# Patient Record
Sex: MALE | Race: BLACK OR AFRICAN AMERICAN | Employment: FULL TIME | URBAN - METROPOLITAN AREA
[De-identification: names, ages, dates, MRNs, and addresses within clinical notes are randomized per-mention and may not be internally consistent; named-entity substitution may affect disease eponyms.]

---

## 2017-01-13 ENCOUNTER — OFFICE VISIT (OUTPATIENT)
Dept: ENDOCRINOLOGY | Age: 62
End: 2017-01-13

## 2017-01-13 VITALS
OXYGEN SATURATION: 99 % | HEART RATE: 77 BPM | RESPIRATION RATE: 18 BRPM | BODY MASS INDEX: 33.18 KG/M2 | HEIGHT: 72 IN | TEMPERATURE: 97.2 F | SYSTOLIC BLOOD PRESSURE: 132 MMHG | WEIGHT: 245 LBS | DIASTOLIC BLOOD PRESSURE: 80 MMHG

## 2017-01-13 DIAGNOSIS — I10 ESSENTIAL HYPERTENSION: ICD-10-CM

## 2017-01-13 DIAGNOSIS — Z79.4 ENCOUNTER FOR LONG-TERM (CURRENT) USE OF INSULIN (HCC): ICD-10-CM

## 2017-01-13 DIAGNOSIS — E10.65 HYPERGLYCEMIA DUE TO TYPE 1 DIABETES MELLITUS (HCC): Primary | ICD-10-CM

## 2017-01-13 DIAGNOSIS — E78.2 MIXED HYPERLIPIDEMIA: ICD-10-CM

## 2017-01-13 LAB — HBA1C MFR BLD HPLC: 7.2 %

## 2017-01-13 RX ORDER — INSULIN LISPRO 100 [IU]/ML
INJECTION, SOLUTION INTRAVENOUS; SUBCUTANEOUS
Qty: 15 ML | Refills: 6 | Status: SHIPPED | OUTPATIENT
Start: 2017-01-13 | End: 2018-01-16 | Stop reason: SDUPTHER

## 2017-01-13 NOTE — MR AVS SNAPSHOT
Visit Information Date & Time Provider Department Dept. Phone Encounter #  
 1/13/2017  9:15 AM Lane Childress MD [facility] End[facility]ology [phone] [id number] Follow-up Instructions Return in about 4 months (around 5/13/2017). Upcoming Health Maintenance Date Due Hepatitis C Screening 1955 FOOT EXAM Q1 10/21/1967 EYE EXAM RETINAL OR DILATED Q1 10/21/1967 Pneumococcal 19-64 Medium Risk (1 of 1 - PPSV23) 10/21/1976 DTaP/Tdap/Td series (1 - Tdap) 10/21/1978 FOBT Q 1 YEAR AGE 50-75 10/21/2007 INFLUENZA AGE 9 TO ADULT 8/1/2016 HEMOGLOBIN A1C Q6M 3/30/2017 MICROALBUMIN Q1 9/30/2017 LIPID PANEL Q1 9/30/2017 Allergies as of 1/13/2017  Review Complete On: 1/13/2017 By: Lane Childress MD  
 Not on File Current Immunizations  Never Reviewed No immunizations on file. Not reviewed this visit You Were Diagnosed With   
  
 Codes Comments Uncontrolled diabetes mellitus type 2 without complications, unspecified long term insulin use status ([facility].)    -  Primary ICD-10-CM: E11.65 ICD-9-CM: 250.02 Vitals BP Pulse Temp Resp Height(growth percentile) Weight(growth percentile) 132/80 77 97.2 °F (36.2 °C) (Oral) 18 6' (1.829 m) 245 lb (111.1 kg) SpO2 BMI Smoking Status 99% 33.23 kg/m2 Never Smoker BMI and BSA Data Body Mass Index Body Surface Area  
 33.23 kg/m 2 2.38 m 2 Preferred Pharmacy Pharmacy Name Phone [address], [facility], [place] [address] [phone] Your Updated Medication List  
  
   
This list is accurate as of: 1/13/17  9:45 AM.  Always use your most recent med list.  
  
  
  
  
 glucagon 1 mg injection Commonly known as:  GLUCAGEN  
1 mL by IntraMUSCular route as needed for Hypoglycemia. glucose blood VI test strips strip Commonly known as:  Myrtie Lindon Test 4 times daily.  Dx code E11.65  
  
 insulin glargine 100 unit/mL (3 mL) pen Commonly known as:  LANTUS SOLOSTAR INJECT 20 UNITS AT BEDTIME  
  
 insulin lispro 100 unit/mL kwikpen Commonly known as:  HUMALOG Inject 1 unit before breakfast, and 5 units before lunch and dinner. Plus sliding scale. Insulin Needles (Disposable) 32 gauge x 5/32\" Ndle Commonly known as:  Emelina Pen Needle Use 4 times daily. Dx code E11.65 Lancets Misc Commonly known as: One Touch Reggy Chew Test 4 times daily. Dx code E11.65  
  
 lisinopril 5 mg tablet Commonly known as:  PRINIVIL, ZESTRIL  
TAKE ONE TABLET BY MOUTH DAILY - STOP 10 MG DOSE  
  
 metFORMIN 1,000 mg tablet Commonly known as:  GLUCOPHAGE  
TAKE ONE TABLET BY MOUTH 2 TIMES A DAY WITH MEALS  
  
 multivitamin tablet Commonly known as:  ONE A DAY Take 1 tablet by mouth daily. pravastatin 40 mg tablet Commonly known as:  PRAVACHOL  
TAKE ONE TABLET BY MOUTH NIGHTLY We Performed the Following AMB POC HEMOGLOBIN A1C [32849 CPT(R)] Follow-up Instructions Return in about 4 months (around 5/13/2017). Patient Instructions Check blood sugars immediately before each meal and at bedtime 
 
 
continue   Lantus insulin  20  units  at bed time Take  humalog 1 unit with b-fast  , 5 units with lunch  And 5 units with dinner OR Carb to insulin ratio 8 gm : 1 unit Take additional humalog  as folllows with meals: 
 
150-200 mg 1 units 201-250 mg 2 units 251-300 mg 3 units 301-350 mg 4 units 351-400 mg 5 units 401-450 mg 6 units 451-500 mg 7 units 
 
 
 
lisinopril to 5 mg a day Do not skip meals Do not eat in between meals Reduce carbs- pasta, rice, potatoes, bread Do not drink juices or sodas Donot eat peanut butter Do not eat sugar free cookies and cakes Do not eat peaches, grapes , pine apples and oranges Introducing \[facility] [place] [facility]\"" & HEALTH SERVICES! Angie Mclean introduces [employer] patient portal. Now you can access parts of your medical record, email your doctor's office, and request medication refills online. 1. In your internet browser, go to [email or web address] 2. Click on the First Time User? Click Here link in the Sign In box. You will see the New Member Sign Up page. 3. Enter your [employer] Access Code exactly as it appears below. You will not need to use this code after youve completed the sign-up process. If you do not sign up before the expiration date, you must request a new code. · [employer] Access Code: [email or web address] Expires: 4/13/2017  9:45 AM 
 
4. Enter the last four digits of your Social Security Number (xxxx) and Date of Birth (mm/dd/yyyy) as indicated and click Submit. You will be taken to the next sign-up page. 5. Create a [employer] ID. This will be your [employer] login ID and cannot be changed, so think of one that is secure and easy to remember. 6. Create a [employer] password. You can change your password at any time. 7. Enter your Password Reset Question and Answer. This can be used at a later time if you forget your password. 8. Enter your e-mail address. You will receive e-mail notification when new information is available in [address] 9. Click Sign Up. You can now view and download portions of your medical record. 10. Click the Download Summary menu link to download a portable copy of your medical information. If you have questions, please visit the Frequently Asked Questions section of the [employer] website. Remember, [employer] is NOT to be used for urgent needs. For medical emergencies, dial 911. Now available from your iPhone and Android! Please provide this summary of care documentation to your next provider. Your primary care clinician is listed as Edith Aguila. If you have any questions after today's visit, please call [phone].

## 2017-01-13 NOTE — PATIENT INSTRUCTIONS
Check blood sugars immediately before each meal and at bedtime      continue   Lantus insulin  20  units  at bed time    Take  humalog 1 unit with b-fast  , 5 units with lunch  And 5 units with dinner     OR    Carb to insulin ratio 8 gm : 1 unit     Take additional humalog  as folllows with meals:    150-200 mg 1 units    201-250 mg 2 units    251-300 mg 3 units    301-350 mg 4 units    351-400 mg 5 units    401-450 mg 6 units    451-500 mg 7 units        lisinopril to 5 mg a day         Do not skip meals  Do not eat in between meals    Reduce carbs- pasta, rice, potatoes, bread   Do not drink juices or sodas  Donot eat peanut butter     Do not eat sugar free cookies and cakes   Do not eat peaches, grapes , pine apples and oranges

## 2017-01-13 NOTE — PROGRESS NOTES
Wt Readings from Last 3 Encounters:   01/13/17 245 lb (111.1 kg)   10/07/16 241 lb (109.3 kg)   06/10/16 239 lb (108.4 kg)     Temp Readings from Last 3 Encounters:   01/13/17 97.2 °F (36.2 °C) (Oral)   10/07/16 98.5 °F (36.9 °C) (Oral)   06/10/16 97.5 °F (36.4 °C) (Oral)     BP Readings from Last 3 Encounters:   01/13/17 132/80   10/07/16 126/71   06/10/16 129/79     Pulse Readings from Last 3 Encounters:   01/13/17 77   10/07/16 80   06/10/16 76     Lab Results   Component Value Date/Time    Hemoglobin A1c 7.5 09/30/2016 08:20 AM    Hemoglobin A1c (POC) 7.1 03/11/2016 11:02 AM     No Podiatry  Last Eye exam 2016

## 2017-01-13 NOTE — PROGRESS NOTES
HISTORY OF PRESENT ILLNESS  Lasha Monterroso is a 63 y.o. male. HPI  Patient here for f/u after last visit of Type 2 diabetes mellitus   From Oct  2016      He is comfortable with insulin regimen   He had only a few checks on download   He is on hunting season  Weight gain of 4 lbs           Old history :    .   Referred : by pcp    H/o diabetes for 3  years     Current A1C is over 9%   and symptoms/problems include polyuria, polydipsia and visual disturbances     Current diabetic medications include metformin, prandin  4 mg TID  , lantus 30 units hs . Current monitoring regimen: home blood tests - daily  Home blood sugar records: trend: fluctuating a bit  Any episodes of hypoglycemia? no    Weight trend: fluctuating a lot  Prior visit with dietician: no  Current diet: \"unhealthy\" diet in general  Current exercise: no regular exercise    Known diabetic complications: retinopathy, nephropathy and peripheral neuropathy  Cardiovascular risk factors: dyslipidemia, diabetes mellitus, sedentary life style, male gender, hypertension    Eye exam current (within one year): yes  SOPHIA: no     History reviewed. No pertinent past medical history. History reviewed. No pertinent past surgical history. Current Outpatient Prescriptions   Medication Sig    pravastatin (PRAVACHOL) 40 mg tablet TAKE ONE TABLET BY MOUTH NIGHTLY    lisinopril (PRINIVIL, ZESTRIL) 5 mg tablet TAKE ONE TABLET BY MOUTH DAILY - STOP 10 MG DOSE    metFORMIN (GLUCOPHAGE) 1,000 mg tablet TAKE ONE TABLET BY MOUTH 2 TIMES A DAY WITH MEALS    insulin glargine (LANTUS SOLOSTAR) 100 unit/mL (3 mL) pen INJECT 20 UNITS AT BEDTIME    insulin lispro (HUMALOG) 100 unit/mL kwikpen Inject 1 unit before breakfast, and 5 units before lunch and dinner. Plus sliding scale.  glucose blood VI test strips (ONETOUCH VERIO) strip Test 4 times daily. Dx code E11.65    Lancets (ONE TOUCH DELICA) misc Test 4 times daily.  Dx code E11.65    lisinopril (Sharlene Reasons) 5 mg tablet TAKE ONE TABLET BY MOUTH DAILY (NOTE THIS IS A DECREASE. STOP 10 MG DOSE)    glucagon (GLUCAGEN) 1 mg injection 1 mL by IntraMUSCular route as needed for Hypoglycemia.  Insulin Needles, Disposable, (GAUDENCIO PEN NEEDLE) 32 gauge x 5/32\" ndle Use 4 times daily. Dx code E11.65    multivitamin (ONE A DAY) tablet Take 1 tablet by mouth daily. No current facility-administered medications for this visit. Review of Systems   Constitutional: Negative. HENT: Negative. Eyes: Negative for pain and redness. Respiratory: Negative. Cardiovascular: Negative for chest pain, palpitations and leg swelling. Gastrointestinal: Negative. Negative for constipation. Genitourinary: Negative. Musculoskeletal: Negative for myalgias. Skin: Negative. Neurological: Negative. Endo/Heme/Allergies: Negative. Psychiatric/Behavioral: Negative for depression and memory loss. The patient does not have insomnia. Physical Exam   Constitutional: He is oriented to person, place, and time. He appears well-developed and well-nourished. HENT:   Head: Normocephalic. Eyes: Conjunctivae and EOM are normal. Pupils are equal, round, and reactive to light. Neck: Normal range of motion. Neck supple. No JVD present. No tracheal deviation present. No thyromegaly present. Cardiovascular: Normal rate, regular rhythm and normal heart sounds. Pulmonary/Chest: Effort normal and breath sounds normal.   Abdominal: Soft. Bowel sounds are normal.   Musculoskeletal: Normal range of motion. Lymphadenopathy:     He has no cervical adenopathy. Neurological: He is alert and oriented to person, place, and time. He has normal reflexes. Skin: Skin is warm. Psychiatric: He has a normal mood and affect.      Diabetic feet exam; June 2016     H/o partial or complete amputation of foot: n  H/o previous foot ulceration: n  H/o pre - ulcerative callus: n  H/o peripheral neuropathy and callus: n  H/o poor circulation     SOPHIA   : n  Foot deformity : n          Lab Results   Component Value Date/Time    Hemoglobin A1c 7.5 09/30/2016 08:20 AM    Hemoglobin A1c 7.1 06/03/2016 08:10 AM    Hemoglobin A1c 8.7 12/18/2015 08:36 AM    Glucose 141 09/30/2016 08:20 AM    Glucose  03/11/2016 10:51 AM    Microalb/Creat ratio (ug/mg creat.) 6.3 09/30/2016 08:20 AM    LDL, calculated 94 09/30/2016 08:20 AM    Creatinine 1.07 09/30/2016 08:20 AM      Lab Results   Component Value Date/Time    Cholesterol, total 160 09/30/2016 08:20 AM    HDL Cholesterol 56 09/30/2016 08:20 AM    LDL, calculated 94 09/30/2016 08:20 AM    Triglyceride 49 09/30/2016 08:20 AM     Lab Results   Component Value Date/Time    ALT 15 09/30/2016 08:20 AM    AST 19 09/30/2016 08:20 AM    Alk. phosphatase 57 09/30/2016 08:20 AM    Bilirubin, total 0.3 09/30/2016 08:20 AM     Lab Results   Component Value Date/Time    GFR est AA 88 09/30/2016 08:20 AM    GFR est non-AA 76 09/30/2016 08:20 AM    Creatinine 1.07 09/30/2016 08:20 AM    BUN 16 09/30/2016 08:20 AM    Sodium 139 09/30/2016 08:20 AM    Potassium 5.1 09/30/2016 08:20 AM    Chloride 102 09/30/2016 08:20 AM    CO2 24 09/30/2016 08:20 AM            ASSESSMENT and PLAN  1.  Type 1 DM, uncontrolled : A1c is  7.2 %      From jan 2017   Compared to  7.5 %     From sept 2016  compared to  7.1 %     From June 2016  comapred to   7.1 %  By finger stick = 7.5 %    From march 2016  Compared to    8.7 %     From dec 2015 compared to 8 %    From sept 2015 compared to    7.1 %      From   June 2015 compared to  7.1 %   From April 2015  Compared to   9.2 %  From feb 2015 compared to  9.5 %  From jan 2015     Improved   glycemic control, since on insulin,   As the LESLIE is positive and cpep is negligible   Gave him diagnosis of DIVINA     Discussed the importance of carb counting   Referred him to DTC    Stopped  metformin   Continue on  lantus to 20 mg and humalog     Discussed pump option, not very favorable Patient is advised about checking blood sugars 4 times a day and maintaining log book. 2. Hypoglycemia :  Educated on treating the hypoglycemia. Discussed insulin use      3. HTN : continue prinivil 5 mg  . Patient is educated about importance of compliance with anti-hypertensives especially ARB/ACEI    4. Dyslipidemia : continue pravachol. Patient is educated about benefits and adverse effects of statins and explained how benefits outweigh risk.     5. use of aspirin to prevent MI and TIA's discussed        > 50 % of time is spent on counseling

## 2017-01-15 PROBLEM — E10.65 HYPERGLYCEMIA DUE TO TYPE 1 DIABETES MELLITUS (HCC): Status: ACTIVE | Noted: 2017-01-15

## 2017-01-17 RX ORDER — PEN NEEDLE, DIABETIC 32GX 5/32"
NEEDLE, DISPOSABLE MISCELLANEOUS
Qty: 300 PEN NEEDLE | Refills: 6 | Status: SHIPPED | OUTPATIENT
Start: 2017-01-17 | End: 2018-01-23 | Stop reason: SDUPTHER

## 2017-05-19 ENCOUNTER — OFFICE VISIT (OUTPATIENT)
Dept: ENDOCRINOLOGY | Age: 62
End: 2017-05-19

## 2017-05-19 VITALS
HEIGHT: 72 IN | SYSTOLIC BLOOD PRESSURE: 156 MMHG | RESPIRATION RATE: 12 BRPM | HEART RATE: 87 BPM | WEIGHT: 241 LBS | DIASTOLIC BLOOD PRESSURE: 86 MMHG | BODY MASS INDEX: 32.64 KG/M2 | TEMPERATURE: 97.8 F

## 2017-05-19 DIAGNOSIS — E10.65 HYPERGLYCEMIA DUE TO TYPE 1 DIABETES MELLITUS (HCC): Primary | ICD-10-CM

## 2017-05-19 DIAGNOSIS — Z79.4 ENCOUNTER FOR LONG-TERM (CURRENT) USE OF INSULIN (HCC): ICD-10-CM

## 2017-05-19 DIAGNOSIS — I10 ESSENTIAL HYPERTENSION: ICD-10-CM

## 2017-05-19 DIAGNOSIS — G62.9 NEUROPATHY: ICD-10-CM

## 2017-05-19 DIAGNOSIS — E78.2 MIXED HYPERLIPIDEMIA: ICD-10-CM

## 2017-05-19 LAB
GLUCOSE POC: 95 MG/DL
HBA1C MFR BLD HPLC: 6.2 %

## 2017-05-19 RX ORDER — GABAPENTIN 300 MG/1
300 CAPSULE ORAL
Qty: 30 CAP | Refills: 6 | Status: SHIPPED | OUTPATIENT
Start: 2017-05-19 | End: 2017-12-13 | Stop reason: SDUPTHER

## 2017-05-19 RX ORDER — INSULIN GLARGINE 100 [IU]/ML
INJECTION, SOLUTION SUBCUTANEOUS
Qty: 15 ML | Refills: 6 | Status: SHIPPED | OUTPATIENT
Start: 2017-05-19 | End: 2018-05-11 | Stop reason: SDUPTHER

## 2017-05-19 NOTE — PROGRESS NOTES
Wt Readings from Last 3 Encounters:   05/19/17 241 lb (109.3 kg)   01/13/17 245 lb (111.1 kg)   10/07/16 241 lb (109.3 kg)     Temp Readings from Last 3 Encounters:   05/19/17 97.8 °F (36.6 °C) (Oral)   01/13/17 97.2 °F (36.2 °C) (Oral)   10/07/16 98.5 °F (36.9 °C) (Oral)     BP Readings from Last 3 Encounters:   05/19/17 156/86   01/13/17 132/80   10/07/16 126/71     Pulse Readings from Last 3 Encounters:   05/19/17 87   01/13/17 77   10/07/16 80     Lab Results   Component Value Date/Time    Hemoglobin A1c 7.5 09/30/2016 08:20 AM    Hemoglobin A1c (POC) 7.2 01/13/2017 09:22 AM

## 2017-05-19 NOTE — MR AVS SNAPSHOT
Visit Information Date & Time Provider Department Dept. Phone Encounter #  
 5/19/2017  9:00 AM Cyndi Bowling MD [facility] Diabetes & Endocrinology [phone] [id number] Follow-up Instructions Return in about 4 months (around 9/19/2017). Upcoming Health Maintenance Date Due Hepatitis C Screening 1955 FOOT EXAM Q1 10/21/1967 EYE EXAM RETINAL OR DILATED Q1 10/21/1967 Pneumococcal 19-64 Medium Risk (1 of 1 - PPSV23) 10/21/1976 DTaP/Tdap/Td series (1 - Tdap) 10/21/1978 FOBT Q 1 YEAR AGE 50-75 10/21/2007 HEMOGLOBIN A1C Q6M 7/13/2017 INFLUENZA AGE 9 TO ADULT 8/1/2017 MICROALBUMIN Q1 9/30/2017 LIPID PANEL Q1 9/30/2017 Allergies as of 5/19/2017  Review Complete On: 5/19/2017 By: Cyndi Bowling MD  
 No Known Allergies Current Immunizations  Never Reviewed No immunizations on file. Not reviewed this visit You Were Diagnosed With   
  
 Codes Comments Hyperglycemia due to type 1 diabetes mellitus ([facility]    -  Primary ICD-10-CM: E10.65 ICD-9-CM: 250.01 Encounter for long-term (current) use of insulin ([facility]     ICD-10-CM: Z79.4 ICD-9-CM: V58.67 Mixed hyperlipidemia     ICD-10-CM: E78.2 ICD-9-CM: 272.2 Essential hypertension     ICD-10-CM: I10 
ICD-9-CM: 401.9 Neuropathy     ICD-10-CM: G62.9 ICD-9-CM: 478. 9 Vitals BP Pulse Temp Resp Height(growth percentile) Weight(growth percentile) 156/86 (BP 1 Location: Left arm, BP Patient Position: Sitting) 87 97.8 °F (36.6 °C) (Oral) 12 6' (1.829 m) 241 lb (109.3 kg) BMI Smoking Status 32.69 kg/m2 Never Smoker BMI and BSA Data Body Mass Index Body Surface Area  
 32.69 kg/m 2 2.36 m 2 Preferred Pharmacy Pharmacy Name Phone [address] [place] [address] [phone] Your Updated Medication List  
  
   
This list is accurate as of: 5/19/17  9:35 AM.  Always use your most recent med list.  
  
  
  
  
 glucagon 1 mg injection Commonly known as:  GLUCAGEN  
1 mL by IntraMUSCular route as needed for Hypoglycemia. insulin glargine 100 unit/mL (3 mL) pen Commonly known as:  LANTUS SOLOSTAR INJECT 20 UNITS AT BEDTIME  
  
 insulin lispro 100 unit/mL kwikpen Commonly known as:  HUMALOG Inject 1 unit before breakfast, and 5 units before lunch and dinner. Plus sliding scale. Lancets Misc Commonly known as: One Touch Chidi Jose Test 4 times daily. Dx code E11.65  
  
 lisinopril 5 mg tablet Commonly known as:  PRINIVIL, ZESTRIL  
TAKE ONE TABLET BY MOUTH DAILY - STOP 10 MG DOSE  
  
 metFORMIN 1,000 mg tablet Commonly known as:  GLUCOPHAGE  
TAKE ONE TABLET BY MOUTH 2 TIMES A DAY WITH MEALS  
  
 multivitamin tablet Commonly known as:  ONE A DAY Take 1 tablet by mouth daily. Emelina Pen Needle 32 gauge x 5/32\" Ndle Generic drug:  Insulin Needles (Disposable) USE 4 TIMES DAILY  
  
 ONETOUCH VERIO strip Generic drug:  glucose blood VI test strips USE TO TEST BLOOD SUGAR 4 TIMES A DAY (DX CODE:E11.65) pravastatin 40 mg tablet Commonly known as:  PRAVACHOL  
TAKE ONE TABLET BY MOUTH NIGHTLY We Performed the Following AMB POC GLUCOSE, QUANTITATIVE, BLOOD [14018 CPT(R)] AMB POC HEMOGLOBIN A1C [64341 CPT(R)] Follow-up Instructions Return in about 4 months (around 9/19/2017). Patient Instructions Start on  neurontin  300 mg at night Check blood sugars immediately before each meal and at bedtime 
 
 
continue   Lantus insulin  20  units  at bed time Take  humalog 1 unit with b-fast  , 5 units with lunch  And 5 units with dinner OR Carb to insulin ratio 8 gm : 1 unit Take additional humalog  as folllows with meals: 
 
150-200 mg 1 units 201-250 mg 2 units 251-300 mg 3 units 301-350 mg 4 units 351-400 mg 5 units 401-450 mg 6 units 451-500 mg 7 units lisinopril to 5 mg a day Do not skip meals Do not eat in between meals Reduce carbs- pasta, rice, potatoes, bread Do not drink juices or sodas Donot eat peanut butter Do not eat sugar free cookies and cakes Do not eat peaches, grapes , pine apples and oranges Introducing [facility] & HEALTH SERVICES! Felipa Freitas introduces [employer] patient portal. Now you can access parts of your medical record, email your doctor's office, and request medication refills online. 1. In your internet browser, go to [email or web address] 2. Click on the First Time User? Click Here link in the Sign In box. You will see the New Member Sign Up page. 3. Enter your [employer] Access Code exactly as it appears below. You will not need to use this code after youve completed the sign-up process. If you do not sign up before the expiration date, you must request a new code. · [employer] Access Code: [email or web address] Expires: 8/17/2017  9:35 AM 
 
4. Enter the last four digits of your Social Security Number (xxxx) and Date of Birth (mm/dd/yyyy) as indicated and click Submit. You will be taken to the next sign-up page. 5. Create a [employer] ID. This will be your [employer] login ID and cannot be changed, so think of one that is secure and easy to remember. 6. Create a [employer] password. You can change your password at any time. 7. Enter your Password Reset Question and Answer. This can be used at a later time if you forget your password. 8. Enter your e-mail address. You will receive e-mail notification when new information is available in [address] 9. Click Sign Up. You can now view and download portions of your medical record. 10. Click the Download Summary menu link to download a portable copy of your medical information. If you have questions, please visit the Frequently Asked Questions section of the [employer] website.  Remember, [employer] is NOT to be used for urgent needs. For medical emergencies, dial 911. Now available from your iPhone and Android! Please provide this summary of care documentation to your next provider. Your primary care clinician is listed as Henry Holden. If you have any questions after today's visit, please call [phone].

## 2017-05-19 NOTE — PROGRESS NOTES
HISTORY OF PRESENT ILLNESS  Christina Quintero is a 63 y.o. male. HPI  Patient here for f/u after last visit of Type 1 diabetes mellitus   From jan 2017     He is comfortable with insulin regimen   He had only a few checks on download   He is on hunting season  Weight loss of 4 lbs      Developed foot pain on plantar aspect            Old history :    .   Referred : by pcp    H/o diabetes for 3  years     Current A1C is over 9%   and symptoms/problems include polyuria, polydipsia and visual disturbances     Current diabetic medications include metformin, prandin  4 mg TID  , lantus 30 units hs . Current monitoring regimen: home blood tests - daily  Home blood sugar records: trend: fluctuating a bit  Any episodes of hypoglycemia? no    Weight trend: fluctuating a lot  Prior visit with dietician: no  Current diet: \"unhealthy\" diet in general  Current exercise: no regular exercise    Known diabetic complications: retinopathy, nephropathy and peripheral neuropathy  Cardiovascular risk factors: dyslipidemia, diabetes mellitus, sedentary life style, male gender, hypertension    Eye exam current (within one year): yes  SOPHIA: no     No past medical history on file. No past surgical history on file. Current Outpatient Prescriptions   Medication Sig    ONETOUCH VERIO strip USE TO TEST BLOOD SUGAR 4 TIMES A DAY (DX CODE:E11.65)    insulin lispro (HUMALOG) 100 unit/mL kwikpen Inject 1 unit before breakfast, and 5 units before lunch and dinner. Plus sliding scale.  pravastatin (PRAVACHOL) 40 mg tablet TAKE ONE TABLET BY MOUTH NIGHTLY    lisinopril (PRINIVIL, ZESTRIL) 5 mg tablet TAKE ONE TABLET BY MOUTH DAILY - STOP 10 MG DOSE    metFORMIN (GLUCOPHAGE) 1,000 mg tablet TAKE ONE TABLET BY MOUTH 2 TIMES A DAY WITH MEALS    insulin glargine (LANTUS SOLOSTAR) 100 unit/mL (3 mL) pen INJECT 20 UNITS AT BEDTIME    Lancets (ONE TOUCH DELICA) misc Test 4 times daily.  Dx code E11.65    multivitamin (ONE A DAY) tablet Take 1 tablet by mouth daily.  GAUDENCIO PEN NEEDLE 32 gauge x 5/32\" ndle USE 4 TIMES DAILY    glucagon (GLUCAGEN) 1 mg injection 1 mL by IntraMUSCular route as needed for Hypoglycemia. No current facility-administered medications for this visit. Review of Systems   Constitutional: Negative. HENT: Negative. Eyes: Negative for pain and redness. Respiratory: Negative. Cardiovascular: Negative for chest pain, palpitations and leg swelling. Gastrointestinal: Negative. Negative for constipation. Genitourinary: Negative. Musculoskeletal: Negative for myalgias. Skin: Negative. Neurological: Negative. Endo/Heme/Allergies: Negative. Psychiatric/Behavioral: Negative for depression and memory loss. The patient does not have insomnia. Physical Exam   Constitutional: He is oriented to person, place, and time. He appears well-developed and well-nourished. HENT:   Head: Normocephalic. Eyes: Conjunctivae and EOM are normal. Pupils are equal, round, and reactive to light. Neck: Normal range of motion. Neck supple. No JVD present. No tracheal deviation present. No thyromegaly present. Cardiovascular: Normal rate, regular rhythm and normal heart sounds. Pulmonary/Chest: Effort normal and breath sounds normal.   Abdominal: Soft. Bowel sounds are normal.   Musculoskeletal: Normal range of motion. Lymphadenopathy:     He has no cervical adenopathy. Neurological: He is alert and oriented to person, place, and time. He has normal reflexes. Skin: Skin is warm. Psychiatric: He has a normal mood and affect.      Diabetic feet exam; May  2017    H/o partial or complete amputation of foot: n  H/o previous foot ulceration: n  H/o pre - ulcerative callus: n  H/o peripheral neuropathy and callus: yes  H/o poor circulation     SOPHIA   : n  Foot deformity : n          Lab Results   Component Value Date/Time    Hemoglobin A1c 7.5 09/30/2016 08:20 AM    Hemoglobin A1c 7.1 06/03/2016 08:10 AM Hemoglobin A1c 8.7 12/18/2015 08:36 AM    Glucose 141 09/30/2016 08:20 AM    Glucose POC 95 05/19/2017 09:00 AM    Microalb/Creat ratio (ug/mg creat.) 6.3 09/30/2016 08:20 AM    LDL, calculated 94 09/30/2016 08:20 AM    Creatinine 1.07 09/30/2016 08:20 AM      Lab Results   Component Value Date/Time    Cholesterol, total 160 09/30/2016 08:20 AM    HDL Cholesterol 56 09/30/2016 08:20 AM    LDL, calculated 94 09/30/2016 08:20 AM    Triglyceride 49 09/30/2016 08:20 AM     Lab Results   Component Value Date/Time    ALT (SGPT) 15 09/30/2016 08:20 AM    AST (SGOT) 19 09/30/2016 08:20 AM    Alk. phosphatase 57 09/30/2016 08:20 AM    Bilirubin, total 0.3 09/30/2016 08:20 AM     Lab Results   Component Value Date/Time    GFR est AA 88 09/30/2016 08:20 AM    GFR est non-AA 76 09/30/2016 08:20 AM    Creatinine 1.07 09/30/2016 08:20 AM    BUN 16 09/30/2016 08:20 AM    Sodium 139 09/30/2016 08:20 AM    Potassium 5.1 09/30/2016 08:20 AM    Chloride 102 09/30/2016 08:20 AM    CO2 24 09/30/2016 08:20 AM            ASSESSMENT and PLAN  1. Type 1 DM, uncontrolled : A1c is   6.2 %     From may 2017   compared to  7.2 %      From jan 2017   Compared to  7.5 %     From sept 2016  compared to  7.1 %     From June 2016  comapred to   7.1 %  By finger stick = 7.5 %    From march 2016  Compared to    8.7 %     From dec 2015 compared to 8 %    From sept 2015 compared to    7.1 %      From   June 2015 compared to  7.1 %   From April 2015  Compared to   9.2 %  From feb 2015 compared to  9.5 %  From jan 2015     Improved   glycemic control, since on insulin,   As the LESLIE is positive and cpep is negligible   Gave him diagnosis of DIVINA     Discussed the importance of carb counting   Referred him to D[facility]    Stopped  metformin   Continue on  lantus to 20 mg and humalog     Discussed pump option, not very favorable   Patient is advised about checking blood sugars 4 times a day and maintaining log book.        2. Hypoglycemia :  Educated on treating the hypoglycemia. Discussed insulin use      3. HTN : continue prinivil 5 mg  . Patient is educated about importance of compliance with anti-hypertensives especially ARB/ACEI    4. Dyslipidemia : continue pravachol. Patient is educated about benefits and adverse effects of statins and explained how benefits outweigh risk. 5. use of aspirin to prevent MI and TIA's discussed    6.  Neuropathy  : beginning symptoms   Will try neuronton 300 mg hs   Gave her plantar exercises       > 50 % of time is spent on counseling

## 2017-05-19 NOTE — PATIENT INSTRUCTIONS
Start on  neurontin  300 mg at night       Check blood sugars immediately before each meal and at bedtime      continue   Lantus insulin  20  units  at bed time    Take  humalog 1 unit with b-fast  , 5 units with lunch  And 5 units with dinner     OR    Carb to insulin ratio 8 gm : 1 unit     Take additional humalog  as folllows with meals:    150-200 mg 1 units    201-250 mg 2 units    251-300 mg 3 units    301-350 mg 4 units    351-400 mg 5 units    401-450 mg 6 units    451-500 mg 7 units        lisinopril to 5 mg a day         Do not skip meals  Do not eat in between meals    Reduce carbs- pasta, rice, potatoes, bread   Do not drink juices or sodas  Donot eat peanut butter     Do not eat sugar free cookies and cakes   Do not eat peaches, grapes , pine apples and oranges

## 2017-07-31 RX ORDER — LISINOPRIL 5 MG/1
TABLET ORAL
Qty: 30 TAB | Refills: 0 | Status: SHIPPED | OUTPATIENT
Start: 2017-07-31 | End: 2017-08-30 | Stop reason: SDUPTHER

## 2017-07-31 RX ORDER — PRAVASTATIN SODIUM 40 MG/1
TABLET ORAL
Qty: 30 TAB | Refills: 0 | Status: SHIPPED | OUTPATIENT
Start: 2017-07-31 | End: 2017-08-28 | Stop reason: SDUPTHER

## 2017-08-30 RX ORDER — LISINOPRIL 5 MG/1
TABLET ORAL
Qty: 30 TAB | Refills: 0 | Status: SHIPPED | OUTPATIENT
Start: 2017-08-30 | End: 2017-09-22 | Stop reason: SDUPTHER

## 2017-09-22 ENCOUNTER — OFFICE VISIT (OUTPATIENT)
Dept: ENDOCRINOLOGY | Age: 62
End: 2017-09-22

## 2017-09-22 VITALS
TEMPERATURE: 97.3 F | SYSTOLIC BLOOD PRESSURE: 144 MMHG | HEIGHT: 72 IN | DIASTOLIC BLOOD PRESSURE: 87 MMHG | HEART RATE: 78 BPM | BODY MASS INDEX: 31.83 KG/M2 | RESPIRATION RATE: 16 BRPM | WEIGHT: 235 LBS

## 2017-09-22 DIAGNOSIS — Z79.4 ENCOUNTER FOR LONG-TERM (CURRENT) USE OF INSULIN (HCC): ICD-10-CM

## 2017-09-22 DIAGNOSIS — E78.2 MIXED HYPERLIPIDEMIA: ICD-10-CM

## 2017-09-22 DIAGNOSIS — E10.65 HYPERGLYCEMIA DUE TO TYPE 1 DIABETES MELLITUS (HCC): Primary | ICD-10-CM

## 2017-09-22 DIAGNOSIS — I10 ESSENTIAL HYPERTENSION: ICD-10-CM

## 2017-09-22 RX ORDER — METFORMIN HYDROCHLORIDE 500 MG/1
TABLET, EXTENDED RELEASE ORAL
Qty: 60 TAB | Refills: 12 | Status: SHIPPED | OUTPATIENT
Start: 2017-09-22 | End: 2018-02-02 | Stop reason: ALTCHOICE

## 2017-09-22 RX ORDER — LISINOPRIL 5 MG/1
TABLET ORAL
Qty: 30 TAB | Refills: 12 | Status: SHIPPED | OUTPATIENT
Start: 2017-09-22 | End: 2018-09-28 | Stop reason: SDUPTHER

## 2017-09-22 NOTE — PATIENT INSTRUCTIONS
Stay on  neurontin  300 mg at night       Resume metformin  Er  1000 mg one pill at dinner       Check blood sugars immediately before each meal and at bedtime      continue   Lantus insulin  20  units  at bed time    Take  humalog 1 unit with b-fast  , 5 units with lunch  And 5 units with dinner     OR    Carb to insulin ratio 8 gm : 1 unit     Take additional humalog  as folllows with meals:    150-200 mg 1 units    201-250 mg 2 units    251-300 mg 3 units    301-350 mg 4 units    351-400 mg 5 units    401-450 mg 6 units    451-500 mg 7 units        lisinopril to 5 mg a day         Do not skip meals  Do not eat in between meals    Reduce carbs- pasta, rice, potatoes, bread   Do not drink juices or sodas  Donot eat peanut butter     Do not eat sugar free cookies and cakes   Do not eat peaches, grapes , pine apples and oranges

## 2017-09-22 NOTE — PROGRESS NOTES
HISTORY OF PRESENT ILLNESS  Maliha Estrada is a 63 y.o. male. HPI  Patient here for f/u after last visit of Type 1 diabetes mellitus   From jan 2017     He is comfortable with insulin regimen   He had only a few checks on download   He is on hunting season  Weight loss of 4 lbs      Developed foot pain on plantar aspect            Old history :    .   Referred : by pcp    H/o diabetes for 3  years     Current A1C is over 9%   and symptoms/problems include polyuria, polydipsia and visual disturbances     Current diabetic medications include metformin, prandin  4 mg TID  , lantus 30 units hs . Current monitoring regimen: home blood tests - daily  Home blood sugar records: trend: fluctuating a bit  Any episodes of hypoglycemia? no    Weight trend: fluctuating a lot  Prior visit with dietician: no  Current diet: \"unhealthy\" diet in general  Current exercise: no regular exercise    Known diabetic complications: retinopathy, nephropathy and peripheral neuropathy  Cardiovascular risk factors: dyslipidemia, diabetes mellitus, sedentary life style, male gender, hypertension    Eye exam current (within one year): yes  SOPHIA: no     No past medical history on file. No past surgical history on file. Current Outpatient Prescriptions   Medication Sig    lisinopril (PRINIVIL, ZESTRIL) 5 mg tablet TAKE ONE TABLET BY MOUTH DAILY - STOP 10 MG DOSE    pravastatin (PRAVACHOL) 40 mg tablet TAKE ONE TABLET BY MOUTH NIGHTLY    insulin glargine (LANTUS SOLOSTAR) 100 unit/mL (3 mL) pen INJECT 20 UNITS AT BEDTIME    gabapentin (NEURONTIN) 300 mg capsule Take 1 Cap by mouth nightly.  ONETOUCH VERIO strip USE TO TEST BLOOD SUGAR 4 TIMES A DAY (DX CODE:E11.65)    insulin lispro (HUMALOG) 100 unit/mL kwikpen Inject 1 unit before breakfast, and 5 units before lunch and dinner. Plus sliding scale.  Lancets (ONE TOUCH DELICA) misc Test 4 times daily. Dx code E11.65    multivitamin (ONE A DAY) tablet Take 1 tablet by mouth daily.  GAUDENCIO PEN NEEDLE 32 gauge x 5/32\" ndle USE 4 TIMES DAILY    metFORMIN (GLUCOPHAGE) 1,000 mg tablet TAKE ONE TABLET BY MOUTH 2 TIMES A DAY WITH MEALS    glucagon (GLUCAGEN) 1 mg injection 1 mL by IntraMUSCular route as needed for Hypoglycemia. No current facility-administered medications for this visit. Review of Systems   Constitutional: Negative. HENT: Negative. Eyes: Negative for pain and redness. Respiratory: Negative. Cardiovascular: Negative for chest pain, palpitations and leg swelling. Gastrointestinal: Negative. Negative for constipation. Genitourinary: Negative. Musculoskeletal: Negative for myalgias. Skin: Negative. Neurological: Negative. Endo/Heme/Allergies: Negative. Psychiatric/Behavioral: Negative for depression and memory loss. The patient does not have insomnia. Physical Exam   Constitutional: He is oriented to person, place, and time. He appears well-developed and well-nourished. HENT:   Head: Normocephalic. Eyes: Conjunctivae and EOM are normal. Pupils are equal, round, and reactive to light. Neck: Normal range of motion. Neck supple. No JVD present. No tracheal deviation present. No thyromegaly present. Cardiovascular: Normal rate, regular rhythm and normal heart sounds. Pulmonary/Chest: Effort normal and breath sounds normal.   Abdominal: Soft. Bowel sounds are normal.   Musculoskeletal: Normal range of motion. Lymphadenopathy:     He has no cervical adenopathy. Neurological: He is alert and oriented to person, place, and time. He has normal reflexes. Skin: Skin is warm. Psychiatric: He has a normal mood and affect.      Diabetic feet exam; May  2017    H/o partial or complete amputation of foot: n  H/o previous foot ulceration: n  H/o pre - ulcerative callus: n  H/o peripheral neuropathy and callus: yes  H/o poor circulation     SOPHIA   : n  Foot deformity : n          Lab Results   Component Value Date/Time    Hemoglobin A1c 8.5 09/15/2017 08:35 AM    Hemoglobin A1c 7.5 09/30/2016 08:20 AM    Hemoglobin A1c 7.1 06/03/2016 08:10 AM    Glucose 124 09/15/2017 08:35 AM    Glucose POC 95 05/19/2017 09:00 AM    Microalb/Creat ratio (ug/mg creat.) 1.6 09/15/2017 08:35 AM    LDL, calculated 91 09/15/2017 08:35 AM    Creatinine 1.30 09/15/2017 08:35 AM      Lab Results   Component Value Date/Time    Cholesterol, total 159 09/15/2017 08:35 AM    HDL Cholesterol 58 09/15/2017 08:35 AM    LDL, calculated 91 09/15/2017 08:35 AM    Triglyceride 48 09/15/2017 08:35 AM     Lab Results   Component Value Date/Time    ALT (SGPT) 17 09/15/2017 08:35 AM    AST (SGOT) 26 09/15/2017 08:35 AM    Alk. phosphatase 58 09/15/2017 08:35 AM    Bilirubin, total 0.5 09/15/2017 08:35 AM     Lab Results   Component Value Date/Time    GFR est AA 69 09/15/2017 08:35 AM    GFR est non-AA 60 09/15/2017 08:35 AM    Creatinine 1.30 09/15/2017 08:35 AM    BUN 19 09/15/2017 08:35 AM    Sodium 141 09/15/2017 08:35 AM    Potassium 4.6 09/15/2017 08:35 AM    Chloride 102 09/15/2017 08:35 AM    CO2 24 09/15/2017 08:35 AM            ASSESSMENT and PLAN  1.  Type 1 DM, uncontrolled :  DIVINA     A1c is  8.5 %     From sept 2017    Compared to    6.2 %     From may 2017   compared to  7.2 %      From jan 2017   Compared to  7.5 %     From sept 2016  compared to  7.1 %     From June 2016  comapred to   7.1 %  By finger stick = 7.5 %    From march 2016  Compared to    8.7 %     From dec 2015 compared to 8 %    From sept 2015 compared to    7.1 %      From   June 2015 compared to  7.1 %   From April 2015  Compared to   9.2 %  From feb 2015 compared to  9.5 %  From jan 2015     lost   glycemic control, as I stopped metformin ( thought will keep him off and manage with insulin only )  Since, Fasting blood sugars are higher     As the LESLIE is positive and cpep is negligible , he has  DIVINA     He does well   Resuming  metformin Er 1000 mg at dinner only   Continue on  lantus to 20 mg and humalog Discussed pump option, not very favorable   Patient is advised about checking blood sugars 4 times a day and maintaining log book. 2. Hypoglycemia :  Educated on treating the hypoglycemia. Discussed insulin use      3. HTN : continue prinivil 5 mg  . Patient is educated about importance of compliance with anti-hypertensives especially ARB/ACEI    4. Dyslipidemia : continue pravachol. Patient is educated about benefits and adverse effects of statins and explained how benefits outweigh risk. 5. use of aspirin to prevent MI and TIA's discussed    6.  Neuropathy  : beginning symptoms   Will try neuronton 300 mg hs   Gave her plantar exercises       > 50 % of time is spent on counseling

## 2017-09-22 NOTE — MR AVS SNAPSHOT
Visit Information Date & Time Provider Department Dept. Phone Encounter #  
 9/22/2017 10:30 AM Darryl Arthur MD [facility] [phone] [id number] Follow-up Instructions Return in about 4 months (around 1/22/2018). Upcoming Health Maintenance Date Due Hepatitis C Screening 1955 FOOT EXAM Q1 10/21/1967 EYE EXAM RETINAL OR DILATED Q1 10/21/1967 Pneumococcal 19-64 Medium Risk (1 of 1 - PPSV23) 10/21/1976 DTaP/Tdap/Td series (1 - Tdap) 10/21/1978 FOBT Q 1 YEAR AGE 50-75 10/21/2007 INFLUENZA AGE 9 TO ADULT 8/1/2017 ZOSTER VACCINE AGE 60> 8/21/2017 HEMOGLOBIN A1C Q6M 3/15/2018 MICROALBUMIN Q1 9/15/2018 LIPID PANEL Q1 9/15/2018 Allergies as of 9/22/2017  Review Complete On: 9/22/2017 By: Darryl Arthur MD  
 No Known Allergies Current Immunizations  Never Reviewed No immunizations on file. Not reviewed this visit You Were Diagnosed With   
  
 Codes Comments Hyperglycemia due to type 1 diabetes mellitus ([facility]    -  Primary ICD-10-CM: E10.65 ICD-9-CM: 250.01 Encounter for long-term (current) use of insulin ([facility]     ICD-10-CM: Z79.4 ICD-9-CM: V58.67 Mixed hyperlipidemia     ICD-10-CM: E78.2 ICD-9-CM: 272.2 Essential hypertension     ICD-10-CM: I10 
ICD-9-CM: 401.9 Vitals BP Pulse Temp Resp Height(growth percentile) Weight(growth percentile) 144/87 (BP 1 Location: Right arm, BP Patient Position: Sitting) 78 97.3 °F (36.3 °C) (Oral) 16 6' (1.829 m) 235 lb (106.6 kg) BMI Smoking Status 31.87 kg/m2 Never Smoker BMI and BSA Data Body Mass Index Body Surface Area  
 31.87 kg/m 2 2.33 m 2 Preferred Pharmacy Pharmacy Name Phone [id number] , [facility] [address], [place] [address] [phone] Your Updated Medication List  
  
   
This list is accurate as of: 9/22/17 11:20 AM.  Always use your most recent med list.  
  
  
  
  
 gabapentin 300 mg capsule Commonly known as:  NEURONTIN Take 1 Cap by mouth nightly. glucagon 1 mg injection Commonly known as:  GLUCAGEN  
1 mL by IntraMUSCular route as needed for Hypoglycemia. glucose blood VI test strips strip Commonly known as:  ONETOUCH VERIO  
USE TO TEST BLOOD SUGAR 4 TIMES A DAY (DX CODE:E11.65) insulin glargine 100 unit/mL (3 mL) Inpn Commonly known as:  LANTUS SOLOSTAR INJECT 20 UNITS AT BEDTIME  
  
 insulin lispro 100 unit/mL kwikpen Commonly known as:  HUMALOG Inject 1 unit before breakfast, and 5 units before lunch and dinner. Plus sliding scale. Lancets Misc Commonly known as: One Touch Evelyn Block Test 4 times daily. Dx code E11.65  
  
 lisinopril 5 mg tablet Commonly known as:  PRINIVIL, ZESTRIL  
TAKE ONE TABLET BY MOUTH DAILY - STOP 10 MG DOSE  
  
 metFORMIN  mg tablet Commonly known as:  GLUCOPHAGE XR  
2 pills at dinner   Stop plain metformin  
  
 multivitamin tablet Commonly known as:  ONE A DAY Take 1 tablet by mouth daily. Emelina Pen Needle 32 gauge x \" Ndle Generic drug:  Insulin Needles (Disposable) USE 4 TIMES DAILY pravastatin 40 mg tablet Commonly known as:  PRAVACHOL  
TAKE ONE TABLET BY MOUTH NIGHTLY Prescriptions Sent to Pharmacy Refills  
 lisinopril (PRINIVIL, ZESTRIL) 5 mg tablet 12 Sig: TAKE ONE TABLET BY MOUTH DAILY - STOP 10 MG DOSE Class: Normal  
 Pharmacy: [address] [place] [address] , [address] [place] [address] Ph #: [phone]  
 metFORMIN ER (GLUCOPHAGE XR) 500 mg tablet 12 Si pills at dinner   Stop plain metformin Class: Normal  
 Pharmacy: [facility] Ph #: [phone]  
 glucose blood VI test strips (ONETOUCH VERIO) strip 12 Sig: USE TO TEST BLOOD SUGAR 4 TIMES A DAY (DX CODE:E11.65)  Class: Normal  
 Pharmacy: [address] [place] [address]  , [address], [place] [address]  #: [phone] Follow-up Instructions Return in about 4 months (around 1/22/2018). Patient Instructions Stay on  neurontin  300 mg at night Resume metformin  Er  1000 mg one pill at dinner Check blood sugars immediately before each meal and at bedtime 
 
 
continue   Lantus insulin  20  units  at bed time Take  humalog 1 unit with b-fast  , 5 units with lunch  And 5 units with dinner OR Carb to insulin ratio 8 gm : 1 unit Take additional humalog  as folllows with meals: 
 
150-200 mg 1 units 201-250 mg 2 units 251-300 mg 3 units 301-350 mg 4 units 351-400 mg 5 units 401-450 mg 6 units 451-500 mg 7 units 
 
 
 
lisinopril to 5 mg a day Do not skip meals Do not eat in between meals Reduce carbs- pasta, rice, potatoes, bread Do not drink juices or sodas Donot eat peanut butter Do not eat sugar free cookies and cakes Do not eat peaches, grapes , pine apples and oranges Introducing [facility]! [facility] introduces Delight patient portal. Now you can access parts of your medical record, email your doctor's office, and request medication refills online. 1. In your internet browser, go to [email or web address] 2. Click on the First Time User? Click Here link in the Sign In box. You will see the New Member Sign Up page. 3. Enter your Delight Access Code exactly as it appears below. You will not need to use this code after youve completed the sign-up process. If you do not sign up before the expiration date, you must request a new code. · Delight Access Code: [email or web address] Expires: 12/21/2017 11:20 AM 
 
4. Enter the last four digits of your Social Security Number (xxxx) and Date of Birth (mm/dd/yyyy) as indicated and click Submit. You will be taken to the next sign-up page. 5. Create a Delight ID.  This will be your Delight login ID and cannot be changed, so think of one that is secure and easy to remember. 6. Create a Restalo password. You can change your password at any time. 7. Enter your Password Reset Question and Answer. This can be used at a later time if you forget your password. 8. Enter your e-mail address. You will receive e-mail notification when new information is available in [address] 9. Click Sign Up. You can now view and download portions of your medical record. 10. Click the Download Summary menu link to download a portable copy of your medical information. If you have questions, please visit the Frequently Asked Questions section of the Restalo website. Remember, Restalo is NOT to be used for urgent needs. For medical emergencies, dial 911. Now available from your iPhone and Android! Please provide this summary of care documentation to your next provider. Your primary care clinician is listed as Daisy Moctezuma. If you have any questions after today's visit, please call [phone].

## 2017-09-22 NOTE — PROGRESS NOTES
Wt Readings from Last 3 Encounters:   09/22/17 235 lb (106.6 kg)   05/19/17 241 lb (109.3 kg)   01/13/17 245 lb (111.1 kg)     Temp Readings from Last 3 Encounters:   09/22/17 97.3 °F (36.3 °C) (Oral)   05/19/17 97.8 °F (36.6 °C) (Oral)   01/13/17 97.2 °F (36.2 °C) (Oral)     BP Readings from Last 3 Encounters:   09/22/17 144/87   05/19/17 156/86   01/13/17 132/80     Pulse Readings from Last 3 Encounters:   09/22/17 78   05/19/17 87   01/13/17 77     Lab Results   Component Value Date/Time    Hemoglobin A1c 8.5 09/15/2017 08:35 AM    Hemoglobin A1c (POC) 6.2 05/19/2017 09:00 AM

## 2018-02-02 ENCOUNTER — OFFICE VISIT (OUTPATIENT)
Dept: ENDOCRINOLOGY | Age: 63
End: 2018-02-02

## 2018-02-02 VITALS
TEMPERATURE: 97.5 F | RESPIRATION RATE: 18 BRPM | HEIGHT: 72 IN | OXYGEN SATURATION: 99 % | WEIGHT: 243.3 LBS | SYSTOLIC BLOOD PRESSURE: 126 MMHG | BODY MASS INDEX: 32.95 KG/M2 | HEART RATE: 86 BPM | DIASTOLIC BLOOD PRESSURE: 75 MMHG

## 2018-02-02 DIAGNOSIS — E78.2 MIXED HYPERLIPIDEMIA: ICD-10-CM

## 2018-02-02 DIAGNOSIS — I10 ESSENTIAL HYPERTENSION: ICD-10-CM

## 2018-02-02 DIAGNOSIS — E10.65 HYPERGLYCEMIA DUE TO TYPE 1 DIABETES MELLITUS (HCC): Primary | ICD-10-CM

## 2018-02-02 DIAGNOSIS — E10.65 UNCONTROLLED TYPE 1 DIABETES MELLITUS WITH HYPERGLYCEMIA (HCC): Primary | ICD-10-CM

## 2018-02-02 DIAGNOSIS — Z79.4 ENCOUNTER FOR LONG-TERM (CURRENT) USE OF INSULIN (HCC): ICD-10-CM

## 2018-02-02 DIAGNOSIS — E10.65 HYPERGLYCEMIA DUE TO TYPE 1 DIABETES MELLITUS (HCC): ICD-10-CM

## 2018-02-02 RX ORDER — ATORVASTATIN CALCIUM 40 MG/1
40 TABLET, FILM COATED ORAL DAILY
Qty: 30 TAB | Refills: 6 | Status: SHIPPED | OUTPATIENT
Start: 2018-02-02 | End: 2018-08-29 | Stop reason: SDUPTHER

## 2018-02-02 RX ORDER — METFORMIN HYDROCHLORIDE 1000 MG/1
1000 TABLET ORAL 2 TIMES DAILY WITH MEALS
Qty: 60 TAB | Refills: 6 | Status: SHIPPED | OUTPATIENT
Start: 2018-02-02 | End: 2018-09-04 | Stop reason: SDUPTHER

## 2018-02-02 NOTE — PROGRESS NOTES
HISTORY OF PRESENT ILLNESS  Sarath Sanabria is a 63 y.o. male. HPI  Patient here for f/u after last visit of Type 1 diabetes mellitus   From feb 2018     He is comfortable with insulin regimen   He had only a few checks on download       Weight loss of 4 lbs    He says he used to do so good on metformin plain , not ER   Appears confused     Developed foot pain on plantar aspect            Old history :    .   Referred : by pcp    H/o diabetes for 3  years     Current A1C is over 9%   and symptoms/problems include polyuria, polydipsia and visual disturbances     Current diabetic medications include metformin, prandin  4 mg TID  , lantus 30 units hs . Current monitoring regimen: home blood tests - daily  Home blood sugar records: trend: fluctuating a bit  Any episodes of hypoglycemia? no    Weight trend: fluctuating a lot  Prior visit with dietician: no  Current diet: \"unhealthy\" diet in general  Current exercise: no regular exercise    Known diabetic complications: retinopathy, nephropathy and peripheral neuropathy  Cardiovascular risk factors: dyslipidemia, diabetes mellitus, sedentary life style, male gender, hypertension    Eye exam current (within one year): yes  SOPHIA: no     Past Medical History:   Diagnosis Date    Diabetes ([facility] [address]      History reviewed. No pertinent surgical history. Current Outpatient Prescriptions   Medication Sig    GAUDENCIO PEN NEEDLE 32 gauge x 5/32\" ndle USE 4 TIMES DAILY    HUMALOG KWIKPEN 100 unit/mL kwikpen INJECT 1 UNIT BEFORE BREAKFAST, AND 5 UNITS BEFORE LUNCH AND DINNER.  ( PLUS SLIDING SCALE) MAX DAILY DOSE 32 UNITS    gabapentin (NEURONTIN) 300 mg capsule TAKE ONE CAPSULE BY MOUTH NIGHTLY    lisinopril (PRINIVIL, ZESTRIL) 5 mg tablet TAKE ONE TABLET BY MOUTH DAILY - STOP 10 MG DOSE    metFORMIN ER (GLUCOPHAGE XR) 500 mg tablet 2 pills at dinner   Stop plain metformin    glucose blood VI test strips (ONETOUCH VERIO) strip USE TO TEST BLOOD SUGAR 4 TIMES A DAY (DX CODE:E11.65)  pravastatin (PRAVACHOL) 40 mg tablet TAKE ONE TABLET BY MOUTH NIGHTLY    insulin glargine (LANTUS SOLOSTAR) 100 unit/mL (3 mL) pen INJECT 20 UNITS AT BEDTIME    Lancets (ONE TOUCH DELICA) misc Test 4 times daily. Dx code E11.65    glucagon (GLUCAGEN) 1 mg injection 1 mL by IntraMUSCular route as needed for Hypoglycemia.  multivitamin (ONE A DAY) tablet Take 1 tablet by mouth daily. No current facility-administered medications for this visit. Review of Systems   Constitutional: Negative. HENT: Negative. Eyes: Negative for pain and redness. Respiratory: Negative. Cardiovascular: Negative for chest pain, palpitations and leg swelling. Gastrointestinal: Negative. Negative for constipation. Genitourinary: Negative. Musculoskeletal: Negative for myalgias. Skin: Negative. Neurological: Negative. Endo/Heme/Allergies: Negative. Psychiatric/Behavioral: Negative for depression and memory loss. The patient does not have insomnia. Physical Exam   Constitutional: He is oriented to person, place, and time. He appears well-developed and well-nourished. HENT:   Head: Normocephalic. Eyes: Conjunctivae and EOM are normal. Pupils are equal, round, and reactive to light. Neck: Normal range of motion. Neck supple. No JVD present. No tracheal deviation present. No thyromegaly present. Cardiovascular: Normal rate, regular rhythm and normal heart sounds. Pulmonary/Chest: Effort normal and breath sounds normal.   Abdominal: Soft. Bowel sounds are normal.   Musculoskeletal: Normal range of motion. Lymphadenopathy:     He has no cervical adenopathy. Neurological: He is alert and oriented to person, place, and time. He has normal reflexes. Skin: Skin is warm. Psychiatric: He has a normal mood and affect.    Diabetic foot exam: jan 2018     Left: Reflexes nd     Vibratory sensation normal    Proprioception nd   Sharp/dull discrimination nd    Filament test normal sensation with micro filament   Pulse DP: 2+ (normal)   Pulse PT: nd   Deformities: None  Right: Reflexes nd   Vibratory sensation normal   Proprioception nd   Sharp/dull discrimination nd   Filament test normal sensation with micro filament   Pulse DP: 2+ (normal)   Pulse PT: nd   Deformities: None          Lab Results   Component Value Date/Time    Hemoglobin A1c 9.6 01/26/2018 08:16 AM    Hemoglobin A1c 8.5 09/15/2017 08:35 AM    Hemoglobin A1c 7.5 09/30/2016 08:20 AM    Glucose 203 01/26/2018 08:16 AM    Glucose POC 95 05/19/2017 09:00 AM    Microalb/Creat ratio (ug/mg creat.) 3.1 01/26/2018 08:16 AM    LDL, calculated 110 01/26/2018 08:16 AM    Creatinine 1.15 01/26/2018 08:16 AM      Lab Results   Component Value Date/Time    Cholesterol, total 176 01/26/2018 08:16 AM    HDL Cholesterol 52 01/26/2018 08:16 AM    LDL, calculated 110 01/26/2018 08:16 AM    Triglyceride 70 01/26/2018 08:16 AM     Lab Results   Component Value Date/Time    ALT (SGPT) 15 01/26/2018 08:16 AM    AST (SGOT) 16 01/26/2018 08:16 AM    Alk. phosphatase 61 01/26/2018 08:16 AM    Bilirubin, total 0.4 01/26/2018 08:16 AM     Lab Results   Component Value Date/Time    GFR est AA 80 01/26/2018 08:16 AM    GFR est non-AA 69 01/26/2018 08:16 AM    Creatinine 1.15 01/26/2018 08:16 AM    BUN 14 01/26/2018 08:16 AM    Sodium 140 01/26/2018 08:16 AM    Potassium 5.0 01/26/2018 08:16 AM    Chloride 103 01/26/2018 08:16 AM    CO2 21 01/26/2018 08:16 AM            ASSESSMENT and PLAN  1.  Type 1 DM, poorly controlled :  DIVINA    a1c is 9.6 %   From jan 2018  compared to    A1c is  8.5 %     From sept 2017    Compared to    6.2 %     From may 2017   compared to  7.2 %      From jan 2017   Compared to  7.5 %     From sept 2016  compared to  7.1 %     From June 2016  comapred to   7.1 %  By finger stick = 7.5 %    From march 2016  Compared to    8.7 %     From dec 2015 compared to 8 %    From sept 2015 compared to    7.1 %      From   June 2015 compared to 7.1 %   From April 2015  Compared to   9.2 %  From feb 2015 compared to  9.5 %  From jan 2015     Sudden exacerbation of   glycemic control,   He felt  High sugars since stopping metformin ( thought will keep him off and manage with insulin only )  Now, he says, it is because of going to metformin er and at lwo dose 500 mg     He is failing to understand he is Type 1   As the LESLIE is positive and cpep is negligible , he has  DIVINA     He is made understood again this concept of \"no insulin \" and his brain to be empowered to be thinking like Pancreas   He needs carb counting   Basic understanding given and he is made to see T-slim CDE  Continue on  lantus to 20 mg and humalog     Discussed pump option, not very favorable   Patient is advised about checking blood sugars 4 times a day and maintaining log book. 2. Hypoglycemia :  Educated on treating the hypoglycemia. Discussed insulin use      3. HTN : continue prinivil 5 mg  . Patient is educated about importance of compliance with anti-hypertensives especially ARB/ACEI    4. Dyslipidemia : continue pravachol. Patient is educated about benefits and adverse effects of statins and explained how benefits outweigh risk. 5. use of aspirin to prevent MI and TIA's discussed    6.  Neuropathy  : beginning symptoms    neuronton 300 mg hs   Gave him  plantar exercises       > 50 % of time is spent on counseling   Patient voiced understanding her plan of care

## 2018-02-02 NOTE — MR AVS SNAPSHOT
[address] [facility] [address] [phone] Patient: Yue Calderón MRN: [id number] :1955 Visit Information Date & Time Provider Department Dept. Phone Encounter #  
 2018  9:15 AM Jennifer Nunez MD [facility] [phone] [id number] Follow-up Instructions Return in about 3 months (around 2018). Upcoming Health Maintenance Date Due Hepatitis C Screening 1955 FOOT EXAM Q1 10/21/1967 EYE EXAM RETINAL OR DILATED Q1 10/21/1967 Pneumococcal 19-64 Medium Risk (1 of 1 - PPSV23) 10/21/1976 DTaP/Tdap/Td series (1 - Tdap) 10/21/1978 FOBT Q 1 YEAR AGE 50-75 10/21/2007 Influenza Age 5 to Adult 2017 ZOSTER VACCINE AGE 60> 2017 HEMOGLOBIN A1C Q6M 2018 MICROALBUMIN Q1 2019 LIPID PANEL Q1 2019 Allergies as of 2018  Review Complete On: 2018 By: Jennifer Nunez MD  
 No Known Allergies Current Immunizations  Never Reviewed No immunizations on file. Not reviewed this visit You Were Diagnosed With   
  
 Codes Comments Uncontrolled type 1 diabetes mellitus with hyperglycemia (HCC)    -  Primary ICD-10-CM: E10.65 ICD-9-CM: 250.83, 790.29 Essential hypertension     ICD-10-CM: I10 
ICD-9-CM: 401.9 Mixed hyperlipidemia     ICD-10-CM: E78.2 ICD-9-CM: 272.2 Encounter for long-term (current) use of insulin ([facility] [address]     ICD-10-CM: Z79.4 ICD-9-CM: V58.67 Vitals BP Pulse Temp Resp Height(growth percentile) Weight(growth percentile) 126/75 (BP 1 Location: Right arm, BP Patient Position: Sitting) 86 97.5 °F (36.4 °C) (Oral) 18 6' (1.829 m) 243 lb 4.8 oz (110.4 kg) SpO2 BMI Smoking Status 99% 33 kg/m2 Never Smoker BMI and BSA Data Body Mass Index Body Surface Area  
 33 kg/m 2 2.37 m 2 Preferred Pharmacy Pharmacy Name Phone [address], [facility], [place] [facility] [address] [phone] Your Updated Medication List  
  
   
This list is accurate as of: 2/2/18  9:45 AM.  Always use your most recent med list.  
  
  
  
  
 gabapentin 300 mg capsule Commonly known as:  NEURONTIN  
TAKE ONE CAPSULE BY MOUTH NIGHTLY  
  
 glucagon 1 mg injection Commonly known as:  GLUCAGEN  
1 mL by IntraMUSCular route as needed for Hypoglycemia. glucose blood VI test strips strip Commonly known as:  ONETOUCH VERIO  
USE TO TEST BLOOD SUGAR 4 TIMES A DAY (DX CODE:E11.65) HumaLOG KwikPen 100 unit/mL kwikpen Generic drug:  insulin lispro INJECT 1 UNIT BEFORE BREAKFAST, AND 5 UNITS BEFORE LUNCH AND DINNER. ( PLUS SLIDING SCALE) MAX DAILY DOSE 32 UNITS  
  
 insulin glargine 100 unit/mL (3 mL) Inpn Commonly known as:  LANTUS SOLOSTAR INJECT 20 UNITS AT BEDTIME Lancets Misc Commonly known as: One Touch Dary Crater Test 4 times daily. Dx code E11.65  
  
 lisinopril 5 mg tablet Commonly known as:  PRINIVIL, ZESTRIL  
TAKE ONE TABLET BY MOUTH DAILY - STOP 10 MG DOSE  
  
 metFORMIN  mg tablet Commonly known as:  GLUCOPHAGE XR  
2 pills at dinner   Stop plain metformin  
  
 multivitamin tablet Commonly known as:  ONE A DAY Take 1 tablet by mouth daily. Emelina Pen Needle 32 gauge x 5/32\" Ndle Generic drug:  Insulin Needles (Disposable) USE 4 TIMES DAILY pravastatin 40 mg tablet Commonly known as:  PRAVACHOL  
TAKE ONE TABLET BY MOUTH NIGHTLY We Performed the Following  DIABETES FOOT EXAM [7 Custom] REFERRAL TO OPHTHALMOLOGY [REF57 Custom] Comments:  
 Eye visit with Dr. Klein Debi asap Follow-up Instructions Return in about 3 months (around 5/2/2018). Referral Information Referral ID Referred By Referred To  
  
 [id number] Refugio Faustin Not Available Visits Status Start Date End Date 1 New Request 2/2/18 2/2/19 If your referral has a status of pending review or denied, additional information will be sent to support the outcome of this decision. Patient Instructions Stay on  neurontin  300 mg at night Stop pravachol and start on lipitor 40 mg at night Change to  metformin  Plain 1000 mg one pill twice a day with meals and stop Metformin ER Check blood sugars immediately before each meal and at bedtime 
 
 
continue   Lantus insulin  20  units  at bed time Take  humalog 1 unit with b-fast  , 5 units with lunch  And 5 units with dinner OR Carb to insulin ratio 8 gm : 1 unit Take additional humalog  as folllows with meals: 
 
150-200 mg 1 units 201-250 mg 2 units 251-300 mg 3 units 301-350 mg 4 units 351-400 mg 5 units 401-450 mg 6 units 451-500 mg 7 units 
 
 
 
lisinopril to 5 mg a day Do not skip meals Do not eat in between meals Reduce carbs- pasta, rice, potatoes, bread Do not drink juices or sodas Donot eat peanut butter Do not eat sugar free cookies and cakes Do not eat peaches, grapes , pine apples and oranges Introducing [facility]! Darryl Santillan introduces [employer] patient portal. Now you can access parts of your medical record, email your doctor's office, and request medication refills online. 1. In your internet browser, go to [email or web address] 2. Click on the First Time User? Click Here link in the Sign In box. You will see the New Member Sign Up page. 3. Enter your [employer] Access Code exactly as it appears below. You will not need to use this code after youve completed the sign-up process. If you do not sign up before the expiration date, you must request a new code. · [employer] Access Code: [email or web address] Expires: 5/3/2018  9:42 AM 
 
4. Enter the last four digits of your Social Security Number (xxxx) and Date of Birth (mm/dd/yyyy) as indicated and click Submit.  You will be taken to the next sign-up page. 5. Create a [employer] ID. This will be your [employer] login ID and cannot be changed, so think of one that is secure and easy to remember. 6. Create a [employer] password. You can change your password at any time. 7. Enter your Password Reset Question and Answer. This can be used at a later time if you forget your password. 8. Enter your e-mail address. You will receive e-mail notification when new information is available in [address]. 9. Click Sign Up. You can now view and download portions of your medical record. 10. Click the Download Summary menu link to download a portable copy of your medical information. If you have questions, please visit the Frequently Asked Questions section of the [employer] website. Remember, [employer] is NOT to be used for urgent needs. For medical emergencies, dial 911. Now available from your iPhone and Android! Please provide this summary of care documentation to your next provider. Your primary care clinician is listed as Tarah Del Cid. If you have any questions after today's visit, please call [phone].

## 2018-02-02 NOTE — PATIENT INSTRUCTIONS
Stay on  neurontin  300 mg at night     Stop pravachol and start on lipitor 40 mg at night       Change to  metformin  Plain 1000 mg one pill twice a day with meals and stop Metformin ER      Check blood sugars immediately before each meal and at bedtime      continue   Lantus insulin  20  units  at bed time    Take  humalog 1 unit with b-fast  , 5 units with lunch  And 5 units with dinner     OR    Carb to insulin ratio 8 gm : 1 unit     Take additional humalog  as folllows with meals:    150-200 mg 1 units    201-250 mg 2 units    251-300 mg 3 units    301-350 mg 4 units    351-400 mg 5 units    401-450 mg 6 units    451-500 mg 7 units        lisinopril to 5 mg a day         Do not skip meals  Do not eat in between meals    Reduce carbs- pasta, rice, potatoes, bread   Do not drink juices or sodas  Donot eat peanut butter     Do not eat sugar free cookies and cakes   Do not eat peaches, grapes , pine apples and oranges

## 2018-02-02 NOTE — PROGRESS NOTES
Wt Readings from Last 3 Encounters:   02/02/18 243 lb 4.8 oz (110.4 kg)   09/22/17 235 lb (106.6 kg)   05/19/17 241 lb (109.3 kg)     Temp Readings from Last 3 Encounters:   02/02/18 97.5 °F (36.4 °C) (Oral)   09/22/17 97.3 °F (36.3 °C) (Oral)   05/19/17 97.8 °F (36.6 °C) (Oral)     BP Readings from Last 3 Encounters:   02/02/18 126/75   09/22/17 144/87   05/19/17 156/86     Pulse Readings from Last 3 Encounters:   02/02/18 86   09/22/17 78   05/19/17 87     Need eye exam referral

## 2018-05-11 ENCOUNTER — OFFICE VISIT (OUTPATIENT)
Dept: ENDOCRINOLOGY | Age: 63
End: 2018-05-11

## 2018-05-11 VITALS
OXYGEN SATURATION: 100 % | SYSTOLIC BLOOD PRESSURE: 134 MMHG | HEART RATE: 79 BPM | DIASTOLIC BLOOD PRESSURE: 83 MMHG | RESPIRATION RATE: 18 BRPM | TEMPERATURE: 97.6 F | BODY MASS INDEX: 31.76 KG/M2 | HEIGHT: 72 IN | WEIGHT: 234.5 LBS

## 2018-05-11 DIAGNOSIS — E78.2 MIXED HYPERLIPIDEMIA: ICD-10-CM

## 2018-05-11 DIAGNOSIS — E11.65 TYPE 2 DIABETES MELLITUS WITH HYPERGLYCEMIA, UNSPECIFIED WHETHER LONG TERM INSULIN USE (HCC): Primary | ICD-10-CM

## 2018-05-11 DIAGNOSIS — I10 ESSENTIAL HYPERTENSION: ICD-10-CM

## 2018-05-11 DIAGNOSIS — Z79.4 ENCOUNTER FOR LONG-TERM (CURRENT) USE OF INSULIN (HCC): ICD-10-CM

## 2018-05-11 LAB
GLUCOSE POC: 150 MG/DL
HBA1C MFR BLD HPLC: 7.3 %

## 2018-05-11 RX ORDER — INSULIN GLARGINE 100 [IU]/ML
INJECTION, SOLUTION SUBCUTANEOUS
Qty: 15 ML | Refills: 6 | Status: SHIPPED | OUTPATIENT
Start: 2018-05-11 | End: 2018-09-28 | Stop reason: SDUPTHER

## 2018-05-11 NOTE — PATIENT INSTRUCTIONS
--------------------------------------------------------------------------------------------    Refills    -    please call your pharmacy and have them send us a refill request    Results  -  allow up to a week for lab results to be processed and reviewed. Phone calls  -  Allow upto 24 hrs.  for non-urgent calls to be retained    Prior authorization - It may take up to 2 weeks to process, depending on your insurance    Forms  -  FMLA, DMV, patient assistance, etc. will take up to 2 weeks to process    Cancellations - please notify the office in advance if you cannot keep your appointment    Samples  - will only be dispensed at visits as supply is limited      If you are having a medical emergency call 911    --------------------------------------------------------------------------------------------    Stay on  neurontin  300 mg at night     Stop pravachol and start on lipitor 40 mg at night        metformin  Plain 1000 mg one pill twice a day with meals ( he likes plain)    Check blood sugars immediately before each meal and at bedtime      continue   Lantus insulin  20  units  at bed time    Take  humalog 1 unit with b-fast  , 5 units with lunch  And 5 units with dinner     OR    Carb to insulin ratio 8 gm : 1 unit     Take additional humalog  as folllows with meals:    150-200 mg 1 units    201-250 mg 2 units    251-300 mg 3 units    301-350 mg 4 units    351-400 mg 5 units    401-450 mg 6 units    451-500 mg 7 units        lisinopril to 5 mg a day         Do not skip meals  Do not eat in between meals    Reduce carbs- pasta, rice, potatoes, bread   Do not drink juices or sodas  Donot eat peanut butter     Do not eat sugar free cookies and cakes   Do not eat peaches, grapes , pine apples and oranges

## 2018-05-11 NOTE — MR AVS SNAPSHOT
[address] [facility] [address] 
[phone] Patient: Alberto Bradford MRN: [id number] :1955 Visit Information Date & Time Provider Department Dept. Phone Encounter #  
 2018  9:15 AM Mira Archer MD [facility] [phone] [id number] Follow-up Instructions Return in about 4 months (around 2018). Upcoming Health Maintenance Date Due Hepatitis C Screening 1955 EYE EXAM RETINAL OR DILATED Q1 10/21/1967 Pneumococcal 19-64 Medium Risk (1 of 1 - PPSV23) 10/21/1976 DTaP/Tdap/Td series (1 - Tdap) 10/21/1978 FOBT Q 1 YEAR AGE 50-75 10/21/2007 ZOSTER VACCINE AGE 60> 2017 HEMOGLOBIN A1C Q6M 2018 Influenza Age 5 to Adult 2018 MICROALBUMIN Q1 2019 LIPID PANEL Q1 2019 FOOT EXAM Q1 2019 Allergies as of 2018  Review Complete On: 2018 By: Mira Archer MD  
 No Known Allergies Current Immunizations  Never Reviewed No immunizations on file. Not reviewed this visit You Were Diagnosed With   
  
 Codes Comments Type 2 diabetes mellitus with hyperglycemia, unspecified whether long term insulin use (HCC)    -  Primary ICD-10-CM: E11.65 ICD-9-CM: 250.00 Encounter [facility]-term ([facility]     ICD-10-CM: Z79.4 ICD-9-CM: V58.67 Mixed hyperlipidemia     ICD-10-CM: E78.2 ICD-9-CM: 272.2 Essential hypertension     ICD-10-CM: I10 
ICD-9-CM: 401.9 Vitals BP Pulse Temp Resp Height(growth percentile) Weight(growth percentile) 134/83 (BP 1 Location: Right arm, BP Patient Position: Sitting) 79 97.6 °F (36.4 °C) (Oral) 18 6' (1.829 m) 234 lb 8 oz (106.4 kg) SpO2 BMI Smoking Status 100% 31.8 kg/m2 Never Smoker BMI and BSA Data Body Mass Index Body Surface Area  
 31.8 kg/m 2 2.32 m 2 Preferred Pharmacy Pharmacy Name Phone [address] [facility] [address] ([phone] Your Updated Medication List  
  
   
This list is accurate as of 5/11/18  9:40 AM.  Always use your most recent med list.  
  
  
  
  
 atorvastatin 40 mg tablet Commonly known as:  LIPITOR Take 1 Tab by mouth daily. gabapentin 300 mg capsule Commonly known as:  NEURONTIN  
TAKE ONE CAPSULE BY MOUTH NIGHTLY  
  
 glucagon 1 mg injection Commonly known as:  GLUCAGEN  
1 mL by IntraMUSCular route as needed for Hypoglycemia. HumaLOG KwikPen Insulin 100 unit/mL kwikpen Generic drug:  insulin lispro INJECT 1 UNIT BEFORE BREAKFAST, AND 5 UNITS BEFORE LUNCH AND DINNER. ( PLUS SLIDING SCALE) MAX DAILY DOSE 32 UNITS  
  
 insulin glargine 100 unit/mL (3 mL) Inpn Commonly known as:  LANTUS SOLOSTAR U-100 INSULIN INJECT 20 UNITS AT BEDTIME Lancets Misc Commonly known as: One Touch Still [place] Test 4 times daily. Dx code E11.65  
  
 lisinopril 5 mg tablet Commonly known as:  PRINIVIL, ZESTRIL  
TAKE ONE TABLET BY MOUTH DAILY - STOP 10 MG DOSE  
  
 metFORMIN 1,000 mg tablet Commonly known as:  GLUCOPHAGE Take 1 Tab by mouth two (2) times daily (with meals). multivitamin tablet Commonly known as:  ONE A DAY Take 1 tablet by mouth daily. Emelina Pen Needle 32 gauge x 5/32\" Ndle Generic drug:  Insulin Needles (Disposable) USE 4 TIMES DAILY  
  
 ONETOUCH VERIO strip Generic drug:  glucose blood VI test strips USE TO CHECK BLOOD SUGAR FOUR TIMES DAILY We Performed the Following AMB POC GLUCOSE BLOOD, BY GLUCOSE MONITORING DEVICE [21335 CPT(R)] AMB POC HEMOGLOBIN A1C [42080 CPT(R)] Follow-up Instructions Return in about 4 months (around 9/11/2018).   
  
  
Patient Instructions   
-------------------------------------------------------------------------------------------- 
 
 Refills    -    please call your pharmacy and have them send us a refill request 
 
Results  -  allow up to a week for lab results to be processed and reviewed. Phone calls  -  Allow upto 24 hrs. for non-urgent calls to be retained Prior authorization - It may take up to 2 weeks to process, depending on your insurance Forms  -  FMLA, DMV, patient assistance, etc. will take up to 2 weeks to process Cancellations - please notify the office in advance if you cannot keep your appointment Samples  - will only be dispensed at visits as supply is limited If you are having a medical emergency call 911 
 
-------------------------------------------------------------------------------------------- Stay on  neurontin  300 mg at night Stop pravachol and start on lipitor 40 mg at night  
 
 
 metformin  Plain 1000 mg one pill twice a day with meals ( he likes plain) Check blood sugars immediately before each meal and at bedtime 
 
 
continue   Lantus insulin  20  units  at bed time Take  humalog 1 unit with b-fast  , 5 units with lunch  And 5 units with dinner OR Carb to insulin ratio 8 gm : 1 unit Take additional humalog  as folllows with meals: 
 
150-200 mg 1 units 201-250 mg 2 units 251-300 mg 3 units 301-350 mg 4 units 351-400 mg 5 units 401-450 mg 6 units 451-500 mg 7 units 
 
 
 
lisinopril to 5 mg a day Do not skip meals Do not eat in between meals Reduce carbs- pasta, rice, potatoes, bread Do not drink juices or sodas Donot eat peanut butter Do not eat sugar free cookies and cakes Do not eat peaches, grapes , pine apples and oranges Introducing [facility]! William Rey introduces [employer] patient portal. Now you can access parts of your medical record, email your doctor's office, and request medication refills online. 1. In your internet browser, go to [email or web address] 2. Click on the First Time User? Click Here link in the Sign In box. You will see the New Member Sign Up page. 3. Enter your [employer] Access Code exactly as it appears below. You will not need to use this code after youve completed the sign-up process. If you do not sign up before the expiration date, you must request a new code. · [employer] Access Code: [email or web address] Expires: 8/9/2018  9:40 AM 
 
4. Enter the last four digits of your Social Security Number (xxxx) and Date of Birth (mm/dd/yyyy) as indicated and click Submit. You will be taken to the next sign-up page. 5. Create a Soundflavor ID. This will be your [employer]flavor login ID and cannot be changed, so think of one that is secure and easy to remember. 6. Create a [employer]avor password. You can change your password at any time. 7. Enter your Password Reset Question and Answer. This can be used at a later time if you forget your password. 8. Enter your e-mail address. You will receive e-mail notification when new information is available in [address] 9. Click Sign Up. You can now view and download portions of your medical record. 10. Click the Download Summary menu link to download a portable copy of your medical information. If you have questions, please visit the Frequently Asked Questions section of the [employer] website. Remember, [employer] is NOT to be used for urgent needs. For medical emergencies, dial 911. Now available from your iPhone and Android! Please provide this summary of care documentation to your next provider. Your primary care clinician is listed as Beatrice Venkata. If you have any questions after today's visit, please call [phone].

## 2018-05-11 NOTE — PROGRESS NOTES
Wt Readings from Last 3 Encounters:   05/11/18 234 lb 8 oz (106.4 kg)   02/02/18 243 lb 4.8 oz (110.4 kg)   09/22/17 235 lb (106.6 kg)     Temp Readings from Last 3 Encounters:   05/11/18 97.6 °F (36.4 °C) (Oral)   02/02/18 97.5 °F (36.4 °C) (Oral)   09/22/17 97.3 °F (36.3 °C) (Oral)     BP Readings from Last 3 Encounters:   05/11/18 134/83   02/02/18 126/75   09/22/17 144/87     Pulse Readings from Last 3 Encounters:   05/11/18 79   02/02/18 86   09/22/17 78     Lab Results   Component Value Date/Time    Hemoglobin A1c 9.6 (H) 01/26/2018 08:16 AM    Hemoglobin A1c (POC) 6.2 05/19/2017 09:00 AM     Patient has meter today.

## 2018-05-11 NOTE — PROGRESS NOTES
HISTORY OF PRESENT ILLNESS  Chanell Carcamo is a 63 y.o. male. HPI  Patient here for f/u after last visit of Type 1 diabetes mellitus   From feb 2 2018     He is comfortable with insulin regimen   He had many  checks on download     Weight loss of 9  lbs    He says he used to do so good on metformin plain , not ER ,  I stopped it because of him being DIVINA and that made his sugars go high    He is back onto it and doing better   Appears confused     Developed foot pain on plantar aspect            Old history :    .   Referred : by pcp    H/o diabetes for 3  years     Current A1C is over 9%   and symptoms/problems include polyuria, polydipsia and visual disturbances     Current diabetic medications include metformin, prandin  4 mg TID  , lantus 30 units hs . Current monitoring regimen: home blood tests - daily  Home blood sugar records: trend: fluctuating a bit  Any episodes of hypoglycemia? no    Weight trend: fluctuating a lot  Prior visit with dietician: no  Current diet: \"unhealthy\" diet in general  Current exercise: no regular exercise    Known diabetic complications: retinopathy, nephropathy and peripheral neuropathy  Cardiovascular risk factors: dyslipidemia, diabetes mellitus, sedentary life style, male gender, hypertension    Eye exam current (within one year): yes  SOPHIA: no     Past Medical History:   Diagnosis Date    Diabetes ([facility]      History reviewed. No pertinent surgical history. Current Outpatient Prescriptions   Medication Sig    ONETOUCH VERIO strip USE TO CHECK BLOOD SUGAR FOUR TIMES DAILY    atorvastatin (LIPITOR) 40 mg tablet Take 1 Tab by mouth daily.  metFORMIN (GLUCOPHAGE) 1,000 mg tablet Take 1 Tab by mouth two (2) times daily (with meals).  GAUDENCIO PEN NEEDLE 32 gauge x 5/32\" ndle USE 4 TIMES DAILY    HUMALOG KWIKPEN 100 unit/mL kwikpen INJECT 1 UNIT BEFORE BREAKFAST, AND 5 UNITS BEFORE LUNCH AND DINNER.  ( PLUS SLIDING SCALE) MAX DAILY DOSE 32 UNITS    gabapentin (NEURONTIN) 300 mg capsule TAKE ONE CAPSULE BY MOUTH NIGHTLY    lisinopril (PRINIVIL, ZESTRIL) 5 mg tablet TAKE ONE TABLET BY MOUTH DAILY - STOP 10 MG DOSE    insulin glargine (LANTUS SOLOSTAR) 100 unit/mL (3 mL) pen INJECT 20 UNITS AT BEDTIME    Lancets (ONE TOUCH DELICA) misc Test 4 times daily. Dx code E11.65    glucagon (GLUCAGEN) 1 mg injection 1 mL by IntraMUSCular route as needed for Hypoglycemia.  multivitamin (ONE A DAY) tablet Take 1 tablet by mouth daily. No current facility-administered medications for this visit. Review of Systems   Constitutional: Negative. HENT: Negative. Eyes: Negative for pain and redness. Respiratory: Negative. Cardiovascular: Negative for chest pain, palpitations and leg swelling. Gastrointestinal: Negative. Negative for constipation. Genitourinary: Negative. Musculoskeletal: Negative for myalgias. Skin: Negative. Neurological: Negative. Endo/Heme/Allergies: Negative. Psychiatric/Behavioral: Negative for depression and memory loss. The patient does not have insomnia. Physical Exam   Constitutional: He is oriented to person, place, and time. He appears well-developed and well-nourished. HENT:   Head: Normocephalic. Eyes: Conjunctivae and EOM are normal. Pupils are equal, round, and reactive to light. Neck: Normal range of motion. Neck supple. No JVD present. No tracheal deviation present. No thyromegaly present. Cardiovascular: Normal rate, regular rhythm and normal heart sounds. Pulmonary/Chest: Effort normal and breath sounds normal.   Abdominal: Soft. Bowel sounds are normal.   Musculoskeletal: Normal range of motion. Lymphadenopathy:     He has no cervical adenopathy. Neurological: He is alert and oriented to person, place, and time. He has normal reflexes. Skin: Skin is warm. Psychiatric: He has a normal mood and affect.    Diabetic foot exam: jan 2018     Left: Reflexes nd     Vibratory sensation normal Proprioception nd   Sharp/dull discrimination nd    Filament test normal sensation with micro filament   Pulse DP: 2+ (normal)   Pulse PT: nd   Deformities: None  Right: Reflexes nd   Vibratory sensation normal   Proprioception nd   Sharp/dull discrimination nd   Filament test normal sensation with micro filament   Pulse DP: 2+ (normal)   Pulse PT: nd   Deformities: None          Lab Results   Component Value Date/Time    Hemoglobin A1c 9.6 (H) 01/26/2018 08:16 AM    Hemoglobin A1c 8.5 (H) 09/15/2017 08:35 AM    Hemoglobin A1c 7.5 (H) 09/30/2016 08:20 AM    Glucose 203 (H) 01/26/2018 08:16 AM    Glucose  05/11/2018 09:12 AM    Microalb/Creat ratio (ug/mg creat.) 3.1 01/26/2018 08:16 AM    LDL, calculated 110 (H) 01/26/2018 08:16 AM    Creatinine 1.15 01/26/2018 08:16 AM      Lab Results   Component Value Date/Time    Cholesterol, total 176 01/26/2018 08:16 AM    HDL Cholesterol 52 01/26/2018 08:16 AM    LDL, calculated 110 (H) 01/26/2018 08:16 AM    Triglyceride 70 01/26/2018 08:16 AM     Lab Results   Component Value Date/Time    ALT (SGPT) 15 01/26/2018 08:16 AM    AST (SGOT) 16 01/26/2018 08:16 AM    Alk. phosphatase 61 01/26/2018 08:16 AM    Bilirubin, total 0.4 01/26/2018 08:16 AM     Lab Results   Component Value Date/Time    GFR est AA 80 01/26/2018 08:16 AM    GFR est non-AA 69 01/26/2018 08:16 AM    Creatinine 1.15 01/26/2018 08:16 AM    BUN 14 01/26/2018 08:16 AM    Sodium 140 01/26/2018 08:16 AM    Potassium 5.0 01/26/2018 08:16 AM    Chloride 103 01/26/2018 08:16 AM    CO2 21 01/26/2018 08:16 AM            ASSESSMENT and PLAN  1.  Type 1 DM, un controlled :  DIVINA    a1c  7.3 %  Today  May 2018   a1c is 9.6 %   From jan 2018  compared to A1c is  8.5 %     From sept 2017    Compared to    6.2 %     From may 2017   compared to  7.2 %      From jan 2017   Compared to  7.5 %     From sept 2016  compared to  7.1 %     From June 2016  comapred to   7.1 %  By finger stick = 7.5 %    From march 2016 Compared to    8.7 %     From dec 2015 compared to 8 %    From sept 2015 compared to    7.1 %      From   June 2015 compared to  7.1 %   From April 2015  Compared to   9.2 %  From feb 2015 compared to  9.5 %  From jan 2015     Improvement  of   glycemic control,   He felt  High sugars since stopping metformin ( thought will keep him off and manage with insulin only )  Now, he says, it is because of going to metformin er and at lwo dose 500 mg     He is failing to understand he is Type 1   As the LESLIE is positive and cpep is negligible , he has  DIVINA     He is made understood again this concept of \"no insulin \" and his brain to be empowered to be thinking like Pancreas     He needs carb counting     Basic understanding given and he is made to see T-slim CDE  Continue on  lantus to 20 mg and humalog     He says he used to do so good on metformin plain , not ER , I stopped it because of him being DIVINA and that made his sugars go high    He is back onto it and doing better   Appears confused     Discussed pump option, not very favorable   Patient is advised about checking blood sugars 4 times a day and maintaining log book. 2. Hypoglycemia :  Educated on treating the hypoglycemia. Discussed insulin use      3. HTN : continue prinivil 5 mg  . Patient is educated about importance of compliance with anti-hypertensives especially ARB/ACEI    4. Dyslipidemia : continue pravachol. Patient is educated about benefits and adverse effects of statins and explained how benefits outweigh risk. 5. use of aspirin to prevent MI and TIA's discussed    6.  Neuropathy  : beginning symptoms    neuronton 300 mg hs   Gave him  plantar exercises       > 50 % of time is spent on counseling   Patient voiced understanding her plan of care

## 2018-09-04 DIAGNOSIS — E10.65 HYPERGLYCEMIA DUE TO TYPE 1 DIABETES MELLITUS (HCC): ICD-10-CM

## 2018-09-04 DIAGNOSIS — E78.2 MIXED HYPERLIPIDEMIA: ICD-10-CM

## 2018-09-04 RX ORDER — METFORMIN HYDROCHLORIDE 1000 MG/1
TABLET ORAL
Qty: 60 TAB | Refills: 0 | Status: SHIPPED | OUTPATIENT
Start: 2018-09-04 | End: 2018-09-28 | Stop reason: SDUPTHER

## 2018-09-28 ENCOUNTER — OFFICE VISIT (OUTPATIENT)
Dept: ENDOCRINOLOGY | Age: 63
End: 2018-09-28

## 2018-09-28 VITALS
BODY MASS INDEX: 32.3 KG/M2 | HEIGHT: 72 IN | TEMPERATURE: 97.8 F | OXYGEN SATURATION: 100 % | WEIGHT: 238.5 LBS | HEART RATE: 86 BPM | RESPIRATION RATE: 14 BRPM | DIASTOLIC BLOOD PRESSURE: 89 MMHG | SYSTOLIC BLOOD PRESSURE: 162 MMHG

## 2018-09-28 DIAGNOSIS — E78.2 MIXED HYPERLIPIDEMIA: ICD-10-CM

## 2018-09-28 DIAGNOSIS — I10 ESSENTIAL HYPERTENSION: ICD-10-CM

## 2018-09-28 DIAGNOSIS — Z79.4 ENCOUNTER FOR LONG-TERM (CURRENT) USE OF INSULIN (HCC): ICD-10-CM

## 2018-09-28 DIAGNOSIS — E10.65 HYPERGLYCEMIA DUE TO TYPE 1 DIABETES MELLITUS (HCC): Primary | ICD-10-CM

## 2018-09-28 RX ORDER — LISINOPRIL 5 MG/1
TABLET ORAL
Qty: 30 TAB | Refills: 12 | Status: SHIPPED | OUTPATIENT
Start: 2018-09-28 | End: 2018-10-22 | Stop reason: SDUPTHER

## 2018-09-28 RX ORDER — INSULIN LISPRO 100 [IU]/ML
INJECTION, SOLUTION INTRAVENOUS; SUBCUTANEOUS
Qty: 15 ML | Refills: 6 | Status: SHIPPED | OUTPATIENT
Start: 2018-09-28 | End: 2019-03-29 | Stop reason: SDUPTHER

## 2018-09-28 RX ORDER — INSULIN GLARGINE 100 [IU]/ML
INJECTION, SOLUTION SUBCUTANEOUS
Qty: 15 ML | Refills: 6 | Status: SHIPPED | OUTPATIENT
Start: 2018-09-28 | End: 2019-03-29 | Stop reason: SDUPTHER

## 2018-09-28 RX ORDER — METFORMIN HYDROCHLORIDE 1000 MG/1
TABLET ORAL
Qty: 60 TAB | Refills: 6 | Status: SHIPPED | OUTPATIENT
Start: 2018-09-28 | End: 2019-03-29 | Stop reason: SDUPTHER

## 2018-09-28 NOTE — PATIENT INSTRUCTIONS
--------------------------------------------------------------------------------------------    Refills    -    please call your pharmacy and have them send us a refill request    Results  -  allow up to a week for lab results to be processed and reviewed. Phone calls  -  Allow upto 24 hrs.  for non-urgent calls to be retained    Prior authorization - It may take up to 4 weeks to process, depending on your insurance    Forms  -  FMLA, DMV, patient assistance, etc. will take up to 2 weeks to process    Cancellations - please notify the office in advance if you cannot keep your appointment    Samples  - will only be dispensed at visits as supply is limited      If you are having a medical emergency call 911    --------------------------------------------------------------------------------------------    Stay on  neurontin  300 mg at night     Stop pravachol and start on lipitor 40 mg at night        metformin  Plain 1000 mg one pill twice a day with meals ( he likes plain)    Check blood sugars immediately before each meal and at bedtime      continue   Lantus insulin  20  units  at bed time    Take  humalog 1 unit with b-fast  , 5 units with lunch  And 5 units with dinner     OR    Carb to insulin ratio 8 gm : 1 unit     Take additional humalog  as folllows with meals:    150-200 mg 1 units    201-250 mg 2 units    251-300 mg 3 units    301-350 mg 4 units    351-400 mg 5 units    401-450 mg 6 units    451-500 mg 7 units        lisinopril to 5 mg a day         Do not skip meals  Do not eat in between meals    Reduce carbs- pasta, rice, potatoes, bread   Do not drink juices or sodas  Donot eat peanut butter     Do not eat sugar free cookies and cakes   Do not eat peaches, grapes , pine apples and oranges

## 2018-09-28 NOTE — MR AVS SNAPSHOT
[address] [id number] 
[phone] Patient: Louise Garcia MRN: [id number] :1955 Visit Information Date & Time Provider Department Dept. Phone Encounter #  
 2018  9:15 AM Jomar Alanis MD [facility] [place] [facility] [phone] [id number] Follow-up Instructions Return in about 6 months (around 3/28/2019). Upcoming Health Maintenance Date Due Hepatitis C Screening 1955 EYE EXAM RETINAL OR DILATED Q1 10/21/1967 Pneumococcal 19-64 Medium Risk (1 of 1 - PPSV23) 10/21/1976 DTaP/Tdap/Td series (1 - Tdap) 10/21/1978 Shingrix Vaccine Age 50> (1 of 2) 10/21/2007 FOBT Q 1 YEAR AGE 50-75 10/21/2007 Influenza Age 5 to Adult 2018 FOOT EXAM Q1 2019 HEMOGLOBIN A1C Q6M 3/21/2019 MICROALBUMIN Q1 2019 LIPID PANEL Q1 2019 Allergies as of 2018  Review Complete On: 2018 By: Jomar Alanis MD  
 No Known Allergies Current Immunizations  Never Reviewed No immunizations on file. Not reviewed this visit You Were Diagnosed With   
  
 Codes Comments Hyperglycemia due to type 1 diabetes mellitus ([facility] [address]    -  Primary ICD-10-CM: E10.65 ICD-9-CM: 250.01 Encounter for long-term (current) use of insulin ([facility] [address]     ICD-10-CM: Z79.4 ICD-9-CM: V58.67 Mixed hyperlipidemia     ICD-10-CM: E78.2 ICD-9-CM: 272.2 Essential hypertension     ICD-10-CM: I10 
ICD-9-CM: 401.9 Vitals BP Pulse Temp Resp Height(growth percentile) Weight(growth percentile) 162/89 (BP 1 Location: Right arm, BP Patient Position: Sitting) 86 97.8 °F (36.6 °C) (Oral) 14 6' (1.829 m) 238 lb 8 oz (108.2 kg) SpO2 BMI Smoking Status 100% 32.35 kg/m2 Never Smoker Vitals History BMI and BSA Data Body Mass Index Body Surface Area  
 32.35 kg/m 2 2.34 m 2 Preferred Pharmacy Pharmacy Name Phone [address] [facility] [address] ([phone] Your Updated Medication List  
  
   
This list is accurate as of 9/28/18  9:27 AM.  Always use your most recent med list.  
  
  
  
  
 atorvastatin 40 mg tablet Commonly known as:  LIPITOR  
TAKE 1 TABLET BY MOUTH EVERY DAY(STOP PRAVACHOL)  
  
 gabapentin 300 mg capsule Commonly known as:  NEURONTIN  
TAKE ONE CAPSULE BY MOUTH NIGHTLY  
  
 glucagon 1 mg injection Commonly known as:  GLUCAGEN  
1 mL by IntraMUSCular route as needed for Hypoglycemia. HumaLOG KwikPen Insulin 100 unit/mL kwikpen Generic drug:  insulin lispro INJECT 1 UNIT BEFORE BREAKFAST, AND 5 UNITS BEFORE LUNCH AND DINNER. ( PLUS SLIDING SCALE) MAX DAILY DOSE 32 UNITS  
  
 insulin glargine 100 unit/mL (3 mL) Inpn Commonly known as:  LANTUS SOLOSTAR U-100 INSULIN INJECT 20 UNITS AT BEDTIME Lancets Misc Commonly known as: One Touch [place] Test 4 times daily. Dx code E11.65  
  
 lisinopril 5 mg tablet Commonly known as:  PRINIVIL, ZESTRIL  
TAKE ONE TABLET BY MOUTH DAILY - STOP 10 MG DOSE  
  
 metFORMIN 1,000 mg tablet Commonly known as:  GLUCOPHAGE  
TAKE ONE TABLET BY MOUTH 2 TIMES A DAY(WITH MEALS)  
  
 multivitamin tablet Commonly known as:  ONE A DAY Take 1 tablet by mouth daily. [place] Pen Needle 32 gauge x 5/32\" Ndle Generic drug:  Insulin Needles (Disposable) USE 4 TIMES DAILY  
  
 ONETOUCH VERIO strip Generic drug:  glucose blood VI test strips USE TO CHECK BLOOD SUGAR FOUR TIMES DAILY Follow-up Instructions Return in about 6 months (around 3/28/2019). Patient Instructions   
-------------------------------------------------------------------------------------------- Refills    -    please call your pharmacy and have them send us a refill request 
 
Results  -  allow up to a week for lab results to be processed and reviewed. Phone calls  -  Allow upto 24 hrs. for non-urgent calls to be retained Prior authorization - It may take up to 4 weeks to process, depending on your insurance Forms  -  FMLA, DMV, patient assistance, etc. will take up to 2 weeks to process Cancellations - please notify the office in advance if you cannot keep your appointment Samples  - will only be dispensed at visits as supply is limited If you are having a medical emergency call 911 
 
-------------------------------------------------------------------------------------------- Stay on  neurontin  300 mg at night Stop pravachol and start on lipitor 40 mg at night  
 
 
 metformin  Plain 1000 mg one pill twice a day with meals ( he likes plain) Check blood sugars immediately before each meal and at bedtime 
 
 
continue   Lantus insulin  20  units  at bed time Take  humalog 1 unit with b-fast  , 5 units with lunch  And 5 units with dinner OR Carb to insulin ratio 8 gm : 1 unit Take additional humalog  as folllows with meals: 
 
150-200 mg 1 units 201-250 mg 2 units 251-300 mg 3 units 301-350 mg 4 units 351-400 mg 5 units 401-450 mg 6 units 451-500 mg 7 units 
 
 
 
lisinopril to 5 mg a day Do not skip meals Do not eat in between meals Reduce carbs- pasta, rice, potatoes, bread Do not drink juices or sodas Donot eat peanut butter Do not eat sugar free cookies and cakes Do not eat peaches, grapes , pine apples and oranges Introducing [facility]! [employer] introduces [employer] patient portal. Now you can access parts of your medical record, email your doctor's office, and request medication refills online. 1. In your internet browser, go to [email or web address] 2. Click on the First Time User? Click Here link in the Sign In box. You will see the New Member Sign Up page. 3. Enter your MyChart Access Code exactly as it appears below.  You will not need to use this code after youve completed the sign-up process. If you do not sign up before the expiration date, you must request a new code. · [employer] Access Code: [email or web address] Expires: 12/27/2018  9:26 AM 
 
4. Enter the last four digits of your Social Security Number (xxxx) and Date of Birth (mm/dd/yyyy) as indicated and click Submit. You will be taken to the next sign-up page. 5. Create a [employer] ID. This will be your [employer] login ID and cannot be changed, so think of one that is secure and easy to remember. 6. Create a [employer] password. You can change your password at any time. 7. Enter your Password Reset Question and Answer. This can be used at a later time if you forget your password. 8. Enter your e-mail address. You will receive e-mail notification when new information is available in [address]. 9. Click Sign Up. You can now view and download portions of your medical record. 10. Click the Download Summary menu link to download a portable copy of your medical information. If you have questions, please visit the Frequently Asked Questions section of the [employer] website. Remember, [employer] is NOT to be used for urgent needs. For medical emergencies, dial 911. Now available from your iPhone and Android! Please provide this summary of care documentation to your next provider. Your primary care clinician is listed as Robert Henderson. If you have any questions after today's visit, please call [phone].

## 2018-09-28 NOTE — PROGRESS NOTES
HISTORY OF PRESENT ILLNESS  Cherylene Hussar is a 63 y.o. male. HPI  Patient here for f/u after last visit of Type 1 diabetes mellitus   From May  2018     He is comfortable with insulin regimen     He is checking only once  A day        Old history   He had many  checks on download     Weight loss of 9  lbs    He says he used to do so good on metformin plain , not ER ,  I stopped it because of him being DIVINA and that made his sugars go high    He is back onto it and doing better   Appears confused     Developed foot pain on plantar aspect            Old history :    .   Referred : by pcp    H/o diabetes for 3  years     Current A1C is over 9%   and symptoms/problems include polyuria, polydipsia and visual disturbances     Current diabetic medications include metformin, prandin  4 mg TID  , lantus 30 units hs . Current monitoring regimen: home blood tests - daily  Home blood sugar records: trend: fluctuating a bit  Any episodes of hypoglycemia? no    Weight trend: fluctuating a lot  Prior visit with dietician: no  Current diet: \"unhealthy\" diet in general  Current exercise: no regular exercise    Known diabetic complications: retinopathy, nephropathy and peripheral neuropathy  Cardiovascular risk factors: dyslipidemia, diabetes mellitus, sedentary life style, male gender, hypertension    Eye exam current (within one year): yes  SOPHIA: no     Past Medical History:   Diagnosis Date    Diabetes ([facility] [address]      History reviewed. No pertinent surgical history.   Current Outpatient Prescriptions   Medication Sig    metFORMIN (GLUCOPHAGE) 1,000 mg tablet TAKE ONE TABLET BY MOUTH 2 TIMES A DAY(WITH MEALS)    atorvastatin (LIPITOR) 40 mg tablet TAKE 1 TABLET BY MOUTH EVERY DAY(STOP PRAVACHOL)    insulin glargine (LANTUS SOLOSTAR U-100 INSULIN) 100 unit/mL (3 mL) inpn INJECT 20 UNITS AT BEDTIME    ONETOUCH VERIO strip USE TO CHECK BLOOD SUGAR FOUR TIMES DAILY    GAUDENCIO PEN NEEDLE 32 gauge x 5/32\" ndle USE 4 TIMES DAILY    HUMALOG KWIKPEN 100 unit/mL kwikpen INJECT 1 UNIT BEFORE BREAKFAST, AND 5 UNITS BEFORE LUNCH AND DINNER. ( PLUS SLIDING SCALE) MAX DAILY DOSE 32 UNITS    gabapentin (NEURONTIN) 300 mg capsule TAKE ONE CAPSULE BY MOUTH NIGHTLY    lisinopril (PRINIVIL, ZESTRIL) 5 mg tablet TAKE ONE TABLET BY MOUTH DAILY - STOP 10 MG DOSE    Lancets (ONE TOUCH DELICA) misc Test 4 times daily. Dx code E11.65    glucagon (GLUCAGEN) 1 mg injection 1 mL by IntraMUSCular route as needed for Hypoglycemia.  multivitamin (ONE A DAY) tablet Take 1 tablet by mouth daily. No current facility-administered medications for this visit. Review of Systems   Constitutional: Negative. HENT: Negative. Eyes: Negative for pain and redness. Respiratory: Negative. Cardiovascular: Negative for chest pain, palpitations and leg swelling. Gastrointestinal: Negative. Negative for constipation. Genitourinary: Negative. Musculoskeletal: Negative for myalgias. Skin: Negative. Neurological: Negative. Endo/Heme/Allergies: Negative. Psychiatric/Behavioral: Negative for depression and memory loss. The patient does not have insomnia. Physical Exam   Constitutional: He is oriented to person, place, and time. He appears well-developed and well-nourished. HENT:   Head: Normocephalic. Eyes: Conjunctivae and EOM are normal. Pupils are equal, round, and reactive to light. Neck: Normal range of motion. Neck supple. No JVD present. No tracheal deviation present. No thyromegaly present. Cardiovascular: Normal rate, regular rhythm and normal heart sounds. Pulmonary/Chest: Effort normal and breath sounds normal.   Abdominal: Soft. Bowel sounds are normal.   Musculoskeletal: Normal range of motion. Lymphadenopathy:     He has no cervical adenopathy. Neurological: He is alert and oriented to person, place, and time. He has normal reflexes. Skin: Skin is warm. Psychiatric: He has a normal mood and affect. Diabetic foot exam: jan 2018     Left: Reflexes nd     Vibratory sensation normal    Proprioception nd   Sharp/dull discrimination nd    Filament test normal sensation with micro filament   Pulse DP: 2+ (normal)   Pulse PT: nd   Deformities: None  Right: Reflexes nd   Vibratory sensation normal   Proprioception nd   Sharp/dull discrimination nd   Filament test normal sensation with micro filament   Pulse DP: 2+ (normal)   Pulse PT: nd   Deformities: None          Lab Results   Component Value Date/Time    Hemoglobin A1c 7.2 (H) 09/21/2018 08:08 AM    Hemoglobin A1c 9.6 (H) 01/26/2018 08:16 AM    Hemoglobin A1c 8.5 (H) 09/15/2017 08:35 AM    Glucose 95 09/21/2018 08:08 AM    Glucose  05/11/2018 09:12 AM    Microalb/Creat ratio (ug/mg creat.) 2.6 09/21/2018 08:08 AM    LDL, calculated 68 09/21/2018 08:08 AM    Creatinine 1.04 09/21/2018 08:08 AM      Lab Results   Component Value Date/Time    Cholesterol, total 129 09/21/2018 08:08 AM    HDL Cholesterol 50 09/21/2018 08:08 AM    LDL, calculated 68 09/21/2018 08:08 AM    Triglyceride 53 09/21/2018 08:08 AM     Lab Results   Component Value Date/Time    ALT (SGPT) 19 09/21/2018 08:08 AM    AST (SGOT) 23 09/21/2018 08:08 AM    Alk. phosphatase 58 09/21/2018 08:08 AM    Bilirubin, total 0.5 09/21/2018 08:08 AM     Lab Results   Component Value Date/Time    GFR est AA 90 09/21/2018 08:08 AM    GFR est non-AA 78 09/21/2018 08:08 AM    Creatinine 1.04 09/21/2018 08:08 AM    BUN 13 09/21/2018 08:08 AM    Sodium 142 09/21/2018 08:08 AM    Potassium 4.5 09/21/2018 08:08 AM    Chloride 102 09/21/2018 08:08 AM    CO2 25 09/21/2018 08:08 AM            ASSESSMENT and PLAN  1.  Type 1 DM, un controlled :  DIVINA      a1c is 7.2 %     From sept 2018     comapred to  a1c  7.3 %  Today  May 2018   a1c is 9.6 %   From jan 2018  compared to A1c is  8.5 %     From sept 2017    Compared to    6.2 %     From may 2017   compared to  7.2 %      From jan 2017   Compared to  7.5 % From sept 2016  compared to  7.1 %     From June 2016  comapred to   7.1 %  By finger stick = 7.5 %    From march 2016  Compared to    8.7 %     From dec 2015 compared to 8 %    From sept 2015 compared to    7.1 %      From   June 2015 compared to  7.1 %   From April 2015  Compared to   9.2 %  From feb 2015 compared to  9.5 %  From jan 2015     Maintained  of   glycemic control,   He felt  High sugars since stopping metformin ( thought will keep him off and manage with insulin only )  Now, he says, it iwas higher because of going  OFF metformin er and at lwo dose 500 mg         He understand the risks of not checking and  He knows now why he has to check   He finally  understands he is Type 1   As the LESLIE is positive and cpep is negligible , he has  DIVINA     Suggested dexcom/freestyle and he is not upto it yet     He is made understood again this concept of \"no insulin \" and his brain to be empowered to be thinking like Pancreas     He needs carb counting     Basic understanding given and he is made to see T-slim CDE  Continue on  lantus to 20 mg and humalog     He says he used to do so good on metformin plain , not ER , I stopped it because of him being DIVINA and that made his sugars go high    He is back onto it and doing better   Appears confused     Discussed pump option, not very favorable   Patient is advised about checking blood sugars 4 times a day and maintaining log book. 2. Hypoglycemia :  Educated on treating the hypoglycemia. Discussed insulin use      3. HTN : continue prinivil 5 mg  . Patient is educated about importance of compliance with anti-hypertensives especially ARB/ACEI    4. Dyslipidemia : continue pravachol. Patient is educated about benefits and adverse effects of statins and explained how benefits outweigh risk. 5. use of aspirin to prevent MI and TIA's discussed    6.  Neuropathy  : beginning symptoms    neuronton 300 mg hs   Gave him  plantar exercises       > 50 % of time is spent on counseling   Patient voiced understanding her plan of care

## 2018-09-28 NOTE — PROGRESS NOTES
Louise Fish is a 63 y.o. male      Chief Complaint   Patient presents with    Diabetes     Last A1c 7.2% (9/21/18)       1. Have you been to the ER, urgent care clinic since your last visit? Hospitalized since your last visit? No    2. Have you seen or consulted any other health care providers outside of the [address] [place] [address] since your last visit? Include any pap smears or colon screening.  No    Wt Readings from Last 3 Encounters:   09/28/18 238 lb 8 oz (108.2 kg)   05/11/18 234 lb 8 oz (106.4 kg)   02/02/18 243 lb 4.8 oz (110.4 kg)     Temp Readings from Last 3 Encounters:   09/28/18 97.8 °F (36.6 °C) (Oral)   05/11/18 97.6 °F (36.4 °C) (Oral)   02/02/18 97.5 °F (36.4 °C) (Oral)     BP Readings from Last 3 Encounters:   09/28/18 162/89   05/11/18 134/83   02/02/18 126/75     Pulse Readings from Last 3 Encounters:   09/28/18 86   05/11/18 79   02/02/18 86

## 2018-10-22 DIAGNOSIS — Z79.4 ENCOUNTER FOR LONG-TERM (CURRENT) USE OF INSULIN (HCC): ICD-10-CM

## 2018-10-22 DIAGNOSIS — E78.2 MIXED HYPERLIPIDEMIA: ICD-10-CM

## 2018-10-22 DIAGNOSIS — I10 ESSENTIAL HYPERTENSION: ICD-10-CM

## 2018-10-22 DIAGNOSIS — E10.65 HYPERGLYCEMIA DUE TO TYPE 1 DIABETES MELLITUS (HCC): ICD-10-CM

## 2018-10-22 RX ORDER — LISINOPRIL 5 MG/1
TABLET ORAL
Qty: 30 TAB | Refills: 6 | Status: SHIPPED | OUTPATIENT
Start: 2018-10-22 | End: 2019-10-04 | Stop reason: SDUPTHER

## 2019-03-22 DIAGNOSIS — E78.2 MIXED HYPERLIPIDEMIA: ICD-10-CM

## 2019-03-22 DIAGNOSIS — E10.65 HYPERGLYCEMIA DUE TO TYPE 1 DIABETES MELLITUS (HCC): ICD-10-CM

## 2019-03-22 DIAGNOSIS — I10 ESSENTIAL HYPERTENSION: ICD-10-CM

## 2019-03-22 DIAGNOSIS — Z79.4 ENCOUNTER FOR LONG-TERM (CURRENT) USE OF INSULIN (HCC): ICD-10-CM

## 2019-03-23 LAB
ALBUMIN SERPL-MCNC: 4 G/DL (ref 3.6–4.8)
ALBUMIN/CREAT UR: <3.3 MG/G CREAT (ref 0–30)
ALBUMIN/GLOB SERPL: 1.3 {RATIO} (ref 1.2–2.2)
ALP SERPL-CCNC: 64 IU/L (ref 39–117)
ALT SERPL-CCNC: 27 IU/L (ref 0–44)
AST SERPL-CCNC: 22 IU/L (ref 0–40)
BILIRUB SERPL-MCNC: 0.5 MG/DL (ref 0–1.2)
BUN SERPL-MCNC: 16 MG/DL (ref 8–27)
BUN/CREAT SERPL: 12 (ref 10–24)
CALCIUM SERPL-MCNC: 9.7 MG/DL (ref 8.6–10.2)
CHLORIDE SERPL-SCNC: 101 MMOL/L (ref 96–106)
CHOLEST SERPL-MCNC: 144 MG/DL (ref 100–199)
CO2 SERPL-SCNC: 23 MMOL/L (ref 20–29)
CREAT SERPL-MCNC: 1.3 MG/DL (ref 0.76–1.27)
CREAT UR-MCNC: 90.3 MG/DL
EST. AVERAGE GLUCOSE BLD GHB EST-MCNC: 180 MG/DL
GLOBULIN SER CALC-MCNC: 3.1 G/DL (ref 1.5–4.5)
GLUCOSE SERPL-MCNC: 154 MG/DL (ref 65–99)
HBA1C MFR BLD: 7.9 % (ref 4.8–5.6)
HDLC SERPL-MCNC: 56 MG/DL
INTERPRETATION, 910389: NORMAL
INTERPRETATION: NORMAL
LDLC SERPL CALC-MCNC: 76 MG/DL (ref 0–99)
Lab: NORMAL
MICROALBUMIN UR-MCNC: <3 UG/ML
PDF IMAGE, 910387: NORMAL
POTASSIUM SERPL-SCNC: 4.9 MMOL/L (ref 3.5–5.2)
PROT SERPL-MCNC: 7.1 G/DL (ref 6–8.5)
SODIUM SERPL-SCNC: 139 MMOL/L (ref 134–144)
TRIGL SERPL-MCNC: 58 MG/DL (ref 0–149)
VLDLC SERPL CALC-MCNC: 12 MG/DL (ref 5–40)

## 2019-03-29 ENCOUNTER — OFFICE VISIT (OUTPATIENT)
Dept: ENDOCRINOLOGY | Age: 64
End: 2019-03-29

## 2019-03-29 VITALS
HEART RATE: 86 BPM | WEIGHT: 230.5 LBS | TEMPERATURE: 97.6 F | OXYGEN SATURATION: 99 % | SYSTOLIC BLOOD PRESSURE: 148 MMHG | RESPIRATION RATE: 18 BRPM | HEIGHT: 72 IN | BODY MASS INDEX: 31.22 KG/M2 | DIASTOLIC BLOOD PRESSURE: 88 MMHG

## 2019-03-29 DIAGNOSIS — E10.65 HYPERGLYCEMIA DUE TO TYPE 1 DIABETES MELLITUS (HCC): Primary | ICD-10-CM

## 2019-03-29 DIAGNOSIS — E10.65 HYPERGLYCEMIA DUE TO TYPE 1 DIABETES MELLITUS (HCC): ICD-10-CM

## 2019-03-29 DIAGNOSIS — I10 ESSENTIAL HYPERTENSION: ICD-10-CM

## 2019-03-29 DIAGNOSIS — Z79.4 ENCOUNTER FOR LONG-TERM (CURRENT) USE OF INSULIN (HCC): ICD-10-CM

## 2019-03-29 DIAGNOSIS — E78.2 MIXED HYPERLIPIDEMIA: ICD-10-CM

## 2019-03-29 RX ORDER — ATORVASTATIN CALCIUM 40 MG/1
TABLET, FILM COATED ORAL
Qty: 30 TAB | Refills: 6 | Status: SHIPPED | OUTPATIENT
Start: 2019-03-29 | End: 2019-10-04 | Stop reason: SDUPTHER

## 2019-03-29 RX ORDER — METFORMIN HYDROCHLORIDE 1000 MG/1
TABLET ORAL
Qty: 60 TAB | Refills: 6 | Status: SHIPPED | OUTPATIENT
Start: 2019-03-29 | End: 2019-10-04 | Stop reason: SDUPTHER

## 2019-03-29 RX ORDER — INSULIN LISPRO 100 [IU]/ML
INJECTION, SOLUTION INTRAVENOUS; SUBCUTANEOUS
Qty: 15 ML | Refills: 6 | Status: SHIPPED | OUTPATIENT
Start: 2019-03-29 | End: 2019-10-04 | Stop reason: SDUPTHER

## 2019-03-29 RX ORDER — INSULIN GLARGINE 100 [IU]/ML
INJECTION, SOLUTION SUBCUTANEOUS
Qty: 15 ML | Refills: 6 | Status: SHIPPED | OUTPATIENT
Start: 2019-03-29 | End: 2019-10-04 | Stop reason: SDUPTHER

## 2019-03-29 NOTE — PROGRESS NOTES
HISTORY OF PRESENT ILLNESS  Evelyne Dougherty is a 66 y.o. male. HPI  Patient here for f/u after last visit of Type 1 diabetes mellitus   From sept 2018     He is not compliant with TLC in winter  PROMISES TO  DO BETTER     CHECKING ONLY FASTING         Old history :    He is comfortable with insulin regimen   He is checking only once  A day        Old history   He had many  checks on download     Weight loss of 9  lbs    He says he used to do so good on metformin plain , not ER ,  I stopped it because of him being DIVINA and that made his sugars go high    He is back onto it and doing better   Appears confused     Developed foot pain on plantar aspect            Old history :    .   Referred : by pcp    H/o diabetes for 3  years     Current A1C is over 9%   and symptoms/problems include polyuria, polydipsia and visual disturbances     Current diabetic medications include metformin, prandin  4 mg TID  , lantus 30 units hs . Current monitoring regimen: home blood tests - daily  Home blood sugar records: trend: fluctuating a bit  Any episodes of hypoglycemia? no    Weight trend: fluctuating a lot  Prior visit with dietician: no  Current diet: \"unhealthy\" diet in general  Current exercise: no regular exercise    Known diabetic complications: retinopathy, nephropathy and peripheral neuropathy  Cardiovascular risk factors: dyslipidemia, diabetes mellitus, sedentary life style, male gender, hypertension    Eye exam current (within one year): yes  SOPHIA: no     Past Medical History:   Diagnosis Date    Diabetes ([facility] [address]      History reviewed. No pertinent surgical history.   Current Outpatient Medications   Medication Sig    gabapentin (NEURONTIN) 300 mg capsule TAKE ONE CAPSULE BY MOUTH EVERY EVENING    lisinopril (PRINIVIL, ZESTRIL) 5 mg tablet TAKE ONE TABLET BY MOUTH DAILY- STOP 10 MG DOSE    GAUDENCIO PEN NEEDLE 32 gauge x 5/32\" ndle USE TO INJECT UP TO FOUR TIMES DAILY    metFORMIN (GLUCOPHAGE) 1,000 mg tablet TAKE ONE TABLET BY MOUTH 2 TIMES A DAY(WITH MEALS)    insulin glargine (LANTUS SOLOSTAR U-100 INSULIN) 100 unit/mL (3 mL) inpn INJECT 20 UNITS AT BEDTIME    insulin lispro (HUMALOG KWIKPEN INSULIN) 100 unit/mL kwikpen INJECT 1 UNIT BEFORE BREAKFAST, AND 5 UNITS BEFORE LUNCH AND DINNER. ( PLUS SLIDING SCALE) MAX DAILY DOSE 32 UNITS    glucose blood VI test strips (ONETOUCH VERIO) strip USE TO CHECK BLOOD SUGAR FOUR TIMES DAILY    atorvastatin (LIPITOR) 40 mg tablet TAKE 1 TABLET BY MOUTH EVERY DAY(STOP PRAVACHOL)    Lancets (ONE TOUCH DELICA) misc Test 4 times daily. Dx code E11.65    glucagon (GLUCAGEN) 1 mg injection 1 mL by IntraMUSCular route as needed for Hypoglycemia.  multivitamin (ONE A DAY) tablet Take 1 tablet by mouth daily. No current facility-administered medications for this visit. Review of Systems   Constitutional: Negative. HENT: Negative. Eyes: Negative for pain and redness. Respiratory: Negative. Cardiovascular: Negative for chest pain, palpitations and leg swelling. Gastrointestinal: Negative. Negative for constipation. Genitourinary: Negative. Musculoskeletal: Negative for myalgias. Skin: Negative. Neurological: Negative. Endo/Heme/Allergies: Negative. Psychiatric/Behavioral: Negative for depression and memory loss. The patient does not have insomnia. Physical Exam   Constitutional: He is oriented to person, place, and time. He appears well-developed and well-nourished. HENT:   Head: Normocephalic. Eyes: Conjunctivae and EOM are normal. Pupils are equal, round, and reactive to light. Neck: Normal range of motion. Neck supple. No JVD present. No tracheal deviation present. No thyromegaly present. Cardiovascular: Normal rate, regular rhythm and normal heart sounds. Pulmonary/Chest: Effort normal and breath sounds normal.   Abdominal: Soft. Bowel sounds are normal.   Musculoskeletal: Normal range of motion.    Lymphadenopathy:     He has no cervical adenopathy. Neurological: He is alert and oriented to person, place, and time. He has normal reflexes. Skin: Skin is warm. Psychiatric: He has a normal mood and affect. Diabetic foot exam: jan 2018     Left: Reflexes nd     Vibratory sensation normal    Proprioception nd   Sharp/dull discrimination nd    Filament test normal sensation with micro filament   Pulse DP: 2+ (normal)   Pulse PT: nd   Deformities: None  Right: Reflexes nd   Vibratory sensation normal   Proprioception nd   Sharp/dull discrimination nd   Filament test normal sensation with micro filament   Pulse DP: 2+ (normal)   Pulse PT: nd   Deformities: None          Lab Results   Component Value Date/Time    Hemoglobin A1c 7.9 (H) 03/22/2019 08:11 AM    Hemoglobin A1c 7.2 (H) 09/21/2018 08:08 AM    Hemoglobin A1c 9.6 (H) 01/26/2018 08:16 AM    Glucose 154 (H) 03/22/2019 08:11 AM    Glucose  05/11/2018 09:12 AM    Microalb/Creat ratio (ug/mg creat.) <3.3 03/22/2019 08:11 AM    LDL, calculated 76 03/22/2019 08:11 AM    Creatinine 1.30 (H) 03/22/2019 08:11 AM      Lab Results   Component Value Date/Time    Cholesterol, total 144 03/22/2019 08:11 AM    HDL Cholesterol 56 03/22/2019 08:11 AM    LDL, calculated 76 03/22/2019 08:11 AM    Triglyceride 58 03/22/2019 08:11 AM     Lab Results   Component Value Date/Time    ALT (SGPT) 27 03/22/2019 08:11 AM    AST (SGOT) 22 03/22/2019 08:11 AM    Alk. phosphatase 64 03/22/2019 08:11 AM    Bilirubin, total 0.5 03/22/2019 08:11 AM     Lab Results   Component Value Date/Time    GFR est AA 68 03/22/2019 08:11 AM    GFR est non-AA 59 (L) 03/22/2019 08:11 AM    Creatinine 1.30 (H) 03/22/2019 08:11 AM    BUN 16 03/22/2019 08:11 AM    Sodium 139 03/22/2019 08:11 AM    Potassium 4.9 03/22/2019 08:11 AM    Chloride 101 03/22/2019 08:11 AM    CO2 23 03/22/2019 08:11 AM            ASSESSMENT and PLAN  1.  Type 1 DM, un controlled :  DIVINA      a1c is 7.9 %   From march 2019   a1c is 7.2 %     From sept 2018     comapred to  a1c  7.3 %  Today  May 2018   a1c is 9.6 %   From jan 2018  compared to A1c is  8.5 %     From sept 2017    Compared to    6.2 %     From may 2017   compared to  7.2 %      From jan 2017   Compared to  7.5 %     From sept 2016  compared to  7.1 %     From June 2016  comapred to   7.1 %  By finger stick = 7.5 %    From march 2016  Compared to    8.7 %     From dec 2015 compared to 8 %    From sept 2015      Losing    glycemic control,   DIET NON COMPLIANCE   ASKING HIM TO CHECK MORE OFTEN    He understand the risks of not checking and  He knows now why he has to check   He finally  understands he is Type 1   As the LESLIE is positive and cpep is negligible , he has  DIVINA     Suggested dexcom/freestyle and he is not upto it yet   He needs carb counting     Basic understanding given and he is made to see T-slim CDE  Continue on  lantus to 20 mg and humalog     He says he used to do so good on metformin plain , not ER , I stopped it because of him being DIVINA and that made his sugars go high  He is back onto it and doing better       Discussed pump option, not very favorable   Patient is advised about checking blood sugars 4 times a day and maintaining log book. 2. Hypoglycemia :  Educated on treating the hypoglycemia. Discussed insulin use      3. HTN : continue prinivil 5 mg  . Patient is educated about importance of compliance with anti-hypertensives especially ARB/ACEI    4. Dyslipidemia : continue pravachol. Patient is educated about benefits and adverse effects of statins and explained how benefits outweigh risk. 5. use of aspirin to prevent MI and TIA's discussed    6.  Neuropathy  : beginning symptoms    neuronton 300 mg hs   Gave him  plantar exercises       > 50 % of time is spent on counseling   Patient voiced understanding her plan of care

## 2019-03-29 NOTE — PROGRESS NOTES
1. Have you been to the ER, urgent care clinic since your last visit? No  Hospitalized since your last visit? No    2. Have you seen or consulted any other health care providers outside of the [address] [place] [address] since your last visit? Include any pap smears or colon screening. No     Wt Readings from Last 3 Encounters:   03/29/19 230 lb 8 oz (104.6 kg)   09/28/18 238 lb 8 oz (108.2 kg)   05/11/18 234 lb 8 oz (106.4 kg)     Temp Readings from Last 3 Encounters:   03/29/19 97.6 °F (36.4 °C) (Oral)   09/28/18 97.8 °F (36.6 °C) (Oral)   05/11/18 97.6 °F (36.4 °C) (Oral)     BP Readings from Last 3 Encounters:   03/29/19 148/88   09/28/18 162/89   05/11/18 134/83     Pulse Readings from Last 3 Encounters:   03/29/19 86   09/28/18 86   05/11/18 79     Lab Results   Component Value Date/Time    Hemoglobin A1c 7.9 (H) 03/22/2019 08:11 AM    Hemoglobin A1c (POC) 7.3 05/11/2018 09:11 AM     Patient has meter today.

## 2019-03-29 NOTE — PATIENT INSTRUCTIONS
--------------------------------------------------------------------------------------------    Refills    -    please call your pharmacy and have them send us a refill request    Results  -  allow up to a week for lab results to be processed and reviewed. Phone calls  -  Allow upto 24 hrs.  for non-urgent calls to be retained    Prior authorization - It may take up to 4 weeks to process, depending on your insurance    Forms  -  FMLA, DMV, patient assistance, etc. will take up to 2 weeks to process    Cancellations - please notify the office in advance if you cannot keep your appointment    Samples  - will only be dispensed at visits as supply is limited      If you are having a medical emergency call 911    --------------------------------------------------------------------------------------------    Stay on  neurontin  300 mg at night     Stop pravachol and start on lipitor 40 mg at night        metformin  Plain 1000 mg one pill twice a day with meals ( he likes plain)    Check blood sugars immediately before each meal and at bedtime      continue   Lantus insulin  20  units  at bed time    Take  humalog 1 unit with b-fast  , 5 units with lunch  And 5 units with dinner     OR    Carb to insulin ratio 8 gm : 1 unit     Take additional humalog  as folllows with meals:    150-200 mg 1 units    201-250 mg 2 units    251-300 mg 3 units    301-350 mg 4 units    351-400 mg 5 units    401-450 mg 6 units    451-500 mg 7 units        lisinopril to 5 mg a day         Do not skip meals  Do not eat in between meals    Reduce carbs- pasta, rice, potatoes, bread   Do not drink juices or sodas  Donot eat peanut butter     Do not eat sugar free cookies and cakes   Do not eat peaches, grapes , pine apples and oranges     ----------------------------------------------------------------------------------------------------    Freestyle thad - look up   Or     dexcom

## 2019-09-27 DIAGNOSIS — E78.2 MIXED HYPERLIPIDEMIA: ICD-10-CM

## 2019-09-27 DIAGNOSIS — E10.65 HYPERGLYCEMIA DUE TO TYPE 1 DIABETES MELLITUS (HCC): ICD-10-CM

## 2019-09-27 DIAGNOSIS — Z79.4 ENCOUNTER FOR LONG-TERM (CURRENT) USE OF INSULIN (HCC): ICD-10-CM

## 2019-09-28 DIAGNOSIS — E78.2 MIXED HYPERLIPIDEMIA: ICD-10-CM

## 2019-09-28 DIAGNOSIS — Z79.4 ENCOUNTER FOR LONG-TERM (CURRENT) USE OF INSULIN (HCC): ICD-10-CM

## 2019-09-28 DIAGNOSIS — I10 ESSENTIAL HYPERTENSION: ICD-10-CM

## 2019-09-28 DIAGNOSIS — E10.65 HYPERGLYCEMIA DUE TO TYPE 1 DIABETES MELLITUS (HCC): ICD-10-CM

## 2019-09-28 LAB
ALBUMIN SERPL-MCNC: 4 G/DL (ref 3.6–4.8)
ALBUMIN/CREAT UR: 3.4 MG/G CREAT (ref 0–30)
ALBUMIN/GLOB SERPL: 1.5 {RATIO} (ref 1.2–2.2)
ALP SERPL-CCNC: 58 IU/L (ref 39–117)
ALT SERPL-CCNC: 17 IU/L (ref 0–44)
AST SERPL-CCNC: 15 IU/L (ref 0–40)
BILIRUB SERPL-MCNC: 0.3 MG/DL (ref 0–1.2)
BUN SERPL-MCNC: 13 MG/DL (ref 8–27)
BUN/CREAT SERPL: 12 (ref 10–24)
CALCIUM SERPL-MCNC: 9.2 MG/DL (ref 8.6–10.2)
CHLORIDE SERPL-SCNC: 103 MMOL/L (ref 96–106)
CHOLEST SERPL-MCNC: 136 MG/DL (ref 100–199)
CO2 SERPL-SCNC: 24 MMOL/L (ref 20–29)
CREAT SERPL-MCNC: 1.06 MG/DL (ref 0.76–1.27)
CREAT UR-MCNC: 167.9 MG/DL
EST. AVERAGE GLUCOSE BLD GHB EST-MCNC: 163 MG/DL
GLOBULIN SER CALC-MCNC: 2.6 G/DL (ref 1.5–4.5)
GLUCOSE SERPL-MCNC: 135 MG/DL (ref 65–99)
HBA1C MFR BLD: 7.3 % (ref 4.8–5.6)
HDLC SERPL-MCNC: 58 MG/DL
INTERPRETATION, 910389: NORMAL
LDLC SERPL CALC-MCNC: 68 MG/DL (ref 0–99)
Lab: NORMAL
MICROALBUMIN UR-MCNC: 5.7 UG/ML
POTASSIUM SERPL-SCNC: 4.6 MMOL/L (ref 3.5–5.2)
PROT SERPL-MCNC: 6.6 G/DL (ref 6–8.5)
SODIUM SERPL-SCNC: 140 MMOL/L (ref 134–144)
TRIGL SERPL-MCNC: 48 MG/DL (ref 0–149)
VLDLC SERPL CALC-MCNC: 10 MG/DL (ref 5–40)

## 2019-09-28 RX ORDER — INSULIN LISPRO 100 [IU]/ML
INJECTION, SOLUTION INTRAVENOUS; SUBCUTANEOUS
Qty: 15 ML | Refills: 0 | Status: SHIPPED | OUTPATIENT
Start: 2019-09-28 | End: 2019-10-04 | Stop reason: SDUPTHER

## 2019-10-04 ENCOUNTER — OFFICE VISIT (OUTPATIENT)
Dept: ENDOCRINOLOGY | Age: 64
End: 2019-10-04

## 2019-10-04 VITALS
DIASTOLIC BLOOD PRESSURE: 85 MMHG | WEIGHT: 223.5 LBS | OXYGEN SATURATION: 99 % | HEART RATE: 84 BPM | HEIGHT: 72 IN | TEMPERATURE: 97.2 F | BODY MASS INDEX: 30.27 KG/M2 | RESPIRATION RATE: 20 BRPM | SYSTOLIC BLOOD PRESSURE: 130 MMHG

## 2019-10-04 DIAGNOSIS — E78.2 MIXED HYPERLIPIDEMIA: ICD-10-CM

## 2019-10-04 DIAGNOSIS — E10.65 HYPERGLYCEMIA DUE TO TYPE 1 DIABETES MELLITUS (HCC): ICD-10-CM

## 2019-10-04 DIAGNOSIS — I10 ESSENTIAL HYPERTENSION: ICD-10-CM

## 2019-10-04 DIAGNOSIS — Z79.4 ENCOUNTER FOR LONG-TERM (CURRENT) USE OF INSULIN (HCC): ICD-10-CM

## 2019-10-04 DIAGNOSIS — G62.9 NEUROPATHY: ICD-10-CM

## 2019-10-04 DIAGNOSIS — E10.65 HYPERGLYCEMIA DUE TO TYPE 1 DIABETES MELLITUS (HCC): Primary | ICD-10-CM

## 2019-10-04 RX ORDER — METFORMIN HYDROCHLORIDE 1000 MG/1
TABLET ORAL
Qty: 60 TAB | Refills: 6 | Status: SHIPPED | OUTPATIENT
Start: 2019-10-04 | End: 2020-04-08 | Stop reason: SDUPTHER

## 2019-10-04 RX ORDER — ATORVASTATIN CALCIUM 40 MG/1
TABLET, FILM COATED ORAL
Qty: 30 TAB | Refills: 6 | Status: SHIPPED | OUTPATIENT
Start: 2019-10-04 | End: 2020-04-08 | Stop reason: SDUPTHER

## 2019-10-04 RX ORDER — INSULIN LISPRO 100 [IU]/ML
INJECTION, SOLUTION INTRAVENOUS; SUBCUTANEOUS
Qty: 15 ML | Refills: 6 | Status: SHIPPED | OUTPATIENT
Start: 2019-10-04 | End: 2020-04-08 | Stop reason: SDUPTHER

## 2019-10-04 RX ORDER — INSULIN GLARGINE 100 [IU]/ML
INJECTION, SOLUTION SUBCUTANEOUS
Qty: 15 ML | Refills: 6 | Status: SHIPPED | OUTPATIENT
Start: 2019-10-04 | End: 2019-10-14 | Stop reason: ALTCHOICE

## 2019-10-04 RX ORDER — LISINOPRIL 5 MG/1
TABLET ORAL
Qty: 30 TAB | Refills: 6 | Status: SHIPPED | OUTPATIENT
Start: 2019-10-04 | End: 2019-11-08 | Stop reason: SDUPTHER

## 2019-10-04 NOTE — LETTER
10/4/19 Patient: Reg Jett YOB: 1955 Date of Visit: 10/4/2019 Dominique Saleh, [address] [address] VIA Facsimile: [phone] Dear Dominique Saleh DO, Thank you for referring Mr. Rayshawn Beard to [address] [place] [address] for evaluation. My notes for this consultation are attached. If you have questions, please do not hesitate to call me. I look forward to following your patient along with you. Sincerely, Ilene Espana MD

## 2019-10-04 NOTE — PROGRESS NOTES
HISTORY OF PRESENT ILLNESS  Heaven Denton is a 66 y.o. male. HPI  Patient here for f/u after last visit of Type 1 diabetes mellitus   From MArch 2019      Lost 7 lbs   He checks and is eating right         Old history  :     He is not compliant with TLC in winter  PROMISES TO  DO BETTER     CHECKING ONLY FASTING         Old history :    He is comfortable with insulin regimen   He is checking only once  A day        Old history   He had many  checks on download     Weight loss of 9  lbs    He says he used to do so good on metformin plain , not ER ,  I stopped it because of him being DIVINA and that made his sugars go high    He is back onto it and doing better   Appears confused     Developed foot pain on plantar aspect            Old history :    .   Referred : by pcp    H/o diabetes for 3  years     Current A1C is over 9%   and symptoms/problems include polyuria, polydipsia and visual disturbances     Current diabetic medications include metformin, prandin  4 mg TID  , lantus 30 units hs . Current monitoring regimen: home blood tests - daily  Home blood sugar records: trend: fluctuating a bit  Any episodes of hypoglycemia? no    Weight trend: fluctuating a lot  Prior visit with dietician: no  Current diet: \"unhealthy\" diet in general  Current exercise: no regular exercise    Known diabetic complications: retinopathy, nephropathy and peripheral neuropathy  Cardiovascular risk factors: dyslipidemia, diabetes mellitus, sedentary life style, male gender, hypertension    Eye exam current (within one year): yes  SOPHIA: no     Past Medical History:   Diagnosis Date    Diabetes ([facility] [address]      History reviewed. No pertinent surgical history.   Current Outpatient Medications   Medication Sig    gabapentin (NEURONTIN) 300 mg capsule TAKE ONE CAPSULE BY MOUTH EVERY EVENING    GAUDENCIO PEN NEEDLE 32 gauge x 5/32\" ndle USE TO INJECT UP TO FOUR TIMES DAILY    metFORMIN (GLUCOPHAGE) 1,000 mg tablet TAKE ONE TABLET BY MOUTH 2 TIMES A DAY(WITH MEALS)    insulin lispro (HUMALOG KWIKPEN INSULIN) 100 unit/mL kwikpen INJECT 1 UNIT BEFORE BREAKFAST, AND 5 UNITS BEFORE LUNCH AND DINNER. ( PLUS SLIDING SCALE) MAX DAILY DOSE 32 UNITS    insulin glargine (LANTUS SOLOSTAR U-100 INSULIN) 100 unit/mL (3 mL) inpn INJECT 20 UNITS AT BEDTIME    atorvastatin (LIPITOR) 40 mg tablet TAKE 1 TABLET BY MOUTH EVERY DAY(STOP PRAVACHOL)    lisinopril (PRINIVIL, ZESTRIL) 5 mg tablet TAKE ONE TABLET BY MOUTH DAILY- STOP 10 MG DOSE    glucose blood VI test strips (ONETOUCH VERIO) strip USE TO CHECK BLOOD SUGAR FOUR TIMES DAILY    Lancets (ONE TOUCH DELICA) misc Test 4 times daily. Dx code E11.65    glucagon (GLUCAGEN) 1 mg injection 1 mL by IntraMUSCular route as needed for Hypoglycemia.  multivitamin (ONE A DAY) tablet Take 1 tablet by mouth daily. No current facility-administered medications for this visit. Review of Systems   Constitutional: Negative. HENT: Negative. Eyes: Negative for pain and redness. Respiratory: Negative. Cardiovascular: Negative for chest pain, palpitations and leg swelling. Gastrointestinal: Negative. Negative for constipation. Genitourinary: Negative. Musculoskeletal: Negative for myalgias. Skin: Negative. Neurological: Negative. Endo/Heme/Allergies: Negative. Psychiatric/Behavioral: Negative for depression and memory loss. The patient does not have insomnia. Physical Exam   Constitutional: He is oriented to person, place, and time. He appears well-developed and well-nourished. HENT:   Head: Normocephalic. Eyes: Conjunctivae and EOM are normal. Pupils are equal, round, and reactive to light. Neck: Normal range of motion. Neck supple. No JVD present. No tracheal deviation present. No thyromegaly present. Cardiovascular: Normal rate, regular rhythm and normal heart sounds. Pulmonary/Chest: Effort normal and breath sounds normal.   Abdominal: Soft.  Bowel sounds are normal. Musculoskeletal: Normal range of motion. Lymphadenopathy:     He has no cervical adenopathy. Neurological: He is alert and oriented to person, place, and time. He has normal reflexes. Skin: Skin is warm. Psychiatric: He has a normal mood and affect. Diabetic foot exam: jan 2018     Left: Reflexes nd     Vibratory sensation normal    Proprioception nd   Sharp/dull discrimination nd    Filament test normal sensation with micro filament   Pulse DP: 2+ (normal)   Pulse PT: nd   Deformities: None  Right: Reflexes nd   Vibratory sensation normal   Proprioception nd   Sharp/dull discrimination nd   Filament test normal sensation with micro filament   Pulse DP: 2+ (normal)   Pulse PT: nd   Deformities: None          Lab Results   Component Value Date/Time    Hemoglobin A1c 7.3 (H) 09/27/2019 08:13 AM    Hemoglobin A1c 7.9 (H) 03/22/2019 08:11 AM    Hemoglobin A1c 7.2 (H) 09/21/2018 08:08 AM    Glucose 135 (H) 09/27/2019 08:13 AM    Glucose  05/11/2018 09:12 AM    Microalb/Creat ratio (ug/mg creat.) 3.4 09/27/2019 08:13 AM    LDL, calculated 68 09/27/2019 08:13 AM    Creatinine 1.06 09/27/2019 08:13 AM      Lab Results   Component Value Date/Time    Cholesterol, total 136 09/27/2019 08:13 AM    HDL Cholesterol 58 09/27/2019 08:13 AM    LDL, calculated 68 09/27/2019 08:13 AM    Triglyceride 48 09/27/2019 08:13 AM     Lab Results   Component Value Date/Time    ALT (SGPT) 17 09/27/2019 08:13 AM    AST (SGOT) 15 09/27/2019 08:13 AM    Alk. phosphatase 58 09/27/2019 08:13 AM    Bilirubin, total 0.3 09/27/2019 08:13 AM     Lab Results   Component Value Date/Time    GFR est AA 87 09/27/2019 08:13 AM    GFR est non-AA 75 09/27/2019 08:13 AM    Creatinine 1.06 09/27/2019 08:13 AM    BUN 13 09/27/2019 08:13 AM    Sodium 140 09/27/2019 08:13 AM    Potassium 4.6 09/27/2019 08:13 AM    Chloride 103 09/27/2019 08:13 AM    CO2 24 09/27/2019 08:13 AM            ASSESSMENT and PLAN  1.  Type 1 DM, un controlled :  DIVINA a1c is 7.3 %   From sept 2019    a1c is 7.9 %   From march 2019   a1c is 7.2 %     From sept 2018     comapred to  a1c  7.3 %  Today  May 2018   a1c is 9.6 %   From jan 2018  compared to A1c is  8.5 %     From sept 2017    Compared to    6.2 %     From may 2017   compared to  7.2 %      From jan 2017   Compared to  7.5 %     From sept 2016  compared to  7.1 %     From June 2016  comapred to   7.1 %  By finger stick = 7.5 %    From march 2016  Compared to    8.7 %     From dec 2015 compared to 8 %    From sept 2015      Improved     glycemic control,   DIET better COMPLIANCE   ASKING HIM TO CHECK MORE OFTEN    As the LESLIE is positive and cpep is negligible , he has  DIVINA     Suggested dexcom/freestyle and he is not upto it yet   He needs carb counting   Basic understanding given and he is made to see T-slim CDE    Continue on  lantus to 20 mg and humalog   He says he used to do so good on metformin plain , not ER , I stopped it because of him being DIVINA and that made his sugars go high  He is back onto it and doing better       Discussed pump option, not very favorable   Patient is advised about checking blood sugars 4 times a day and maintaining log book. 2. Hypoglycemia :  Educated on treating the hypoglycemia. Discussed insulin use      3. HTN : continue prinivil 5 mg  . Patient is educated about importance of compliance with anti-hypertensives especially ARB/ACEI    4. Dyslipidemia : continue pravachol. Patient is educated about benefits and adverse effects of statins and explained how benefits outweigh risk. 5. use of aspirin to prevent MI and TIA's discussed    6.  Neuropathy  : beginning symptoms    neuronton 300 mg hs   Gave him  plantar exercises       > 50 % of time is spent on counseling   Patient voiced understanding her plan of care

## 2019-10-04 NOTE — PATIENT INSTRUCTIONS
--------------------------------------------------------------------------------------------    Refills    -    please call your pharmacy and have them send us a refill request    Results  -  allow up to a week for lab results to be processed and reviewed. Phone calls  -  Allow upto 24 hrs.  for non-urgent calls to be retained    Prior authorization - It may take up to 4 weeks to process, depending on your insurance    Forms  -  FMLA, DMV, patient assistance, etc. will take up to 2 weeks to process    Cancellations - please notify the office in advance if you cannot keep your appointment    Samples  - will only be dispensed at visits as supply is limited      If you are having a medical emergency call 911    --------------------------------------------------------------------------------------------    Stay on  neurontin  300 mg at night      lipitor 40 mg at night        metformin  Plain 1000 mg one pill twice a day with meals ( he likes plain)    Check blood sugars immediately before each meal and at bedtime      continue   Lantus insulin  20  units  at bed time    Take  humalog 1 unit with b-fast  , 5 units with lunch  And 5 units with dinner     OR    Carb to insulin ratio 8 gm : 1 unit     Take additional humalog  as folllows with meals:    150-200 mg 1 units    201-250 mg 2 units    251-300 mg 3 units    301-350 mg 4 units    351-400 mg 5 units    401-450 mg 6 units    451-500 mg 7 units        lisinopril to 5 mg a day         Do not skip meals  Do not eat in between meals    Reduce carbs- pasta, rice, potatoes, bread   Do not drink juices or sodas  Donot eat peanut butter     Do not eat sugar free cookies and cakes   Do not eat peaches, grapes , pine apples and oranges

## 2019-10-04 NOTE — PROGRESS NOTES
1. Have you been to the ER, urgent care clinic since your last visit? No Hospitalized since your last visit? No    2. Have you seen or consulted any other health care providers outside of the [address] [place] [address] since your last visit? Include any pap smears or colon screening. No       Wt Readings from Last 3 Encounters:   10/04/19 223 lb 8 oz (101.4 kg)   03/29/19 230 lb 8 oz (104.6 kg)   09/28/18 238 lb 8 oz (108.2 kg)     Temp Readings from Last 3 Encounters:   10/04/19 97.2 °F (36.2 °C) (Oral)   03/29/19 97.6 °F (36.4 °C) (Oral)   09/28/18 97.8 °F (36.6 °C) (Oral)     BP Readings from Last 3 Encounters:   10/04/19 130/85   03/29/19 148/88   09/28/18 162/89     Pulse Readings from Last 3 Encounters:   10/04/19 84   03/29/19 86   09/28/18 86     Lab Results   Component Value Date/Time    Hemoglobin A1c 7.3 (H) 09/27/2019 08:13 AM    Hemoglobin A1c (POC) 7.3 05/11/2018 09:11 AM     Patient has meter today.

## 2019-10-14 RX ORDER — INSULIN GLARGINE 100 [IU]/ML
INJECTION, SOLUTION SUBCUTANEOUS
Qty: 15 ML | Refills: 6 | Status: SHIPPED | OUTPATIENT
Start: 2019-10-14 | End: 2020-04-08 | Stop reason: SDUPTHER

## 2019-10-14 NOTE — TELEPHONE ENCOUNTER
----- Message from Noel Adams sent at 10/14/2019 12:56 PM EDT -----  Regarding: Dr. Star Mar: Eduardo Hobson, with [employer]  Reason for call: PT needs a prior authorization for Lantus 20 mg at bedtime. Please write in the order PT is eligible for Transition of Care. Callback requested: YES  Best contact: [phone]  FAX: [phone]  Additional details: The new cost of the med is $175 a month, with [place] he was paying $40. May want to see if a lower price is available through coupons, etc...

## 2019-11-08 DIAGNOSIS — E10.65 HYPERGLYCEMIA DUE TO TYPE 1 DIABETES MELLITUS (HCC): ICD-10-CM

## 2019-11-08 DIAGNOSIS — I10 ESSENTIAL HYPERTENSION: ICD-10-CM

## 2019-11-08 DIAGNOSIS — Z79.4 ENCOUNTER FOR LONG-TERM (CURRENT) USE OF INSULIN (HCC): ICD-10-CM

## 2019-11-08 DIAGNOSIS — E78.2 MIXED HYPERLIPIDEMIA: ICD-10-CM

## 2019-11-08 RX ORDER — LISINOPRIL 5 MG/1
TABLET ORAL
Qty: 30 TAB | Refills: 6 | Status: SHIPPED | OUTPATIENT
Start: 2019-11-08 | End: 2020-04-08 | Stop reason: SDUPTHER

## 2019-11-08 NOTE — TELEPHONE ENCOUNTER
----- Message from Bev Farr sent at 11/8/2019 12:49 PM EST -----  Regarding: Dr. Michelle Garces  Medication Refill yes    Caller (if not patient):      Relationship of caller (if not patient):      Best contact number(s):[phone]      Name of medication and dosage if known:      Is patient out of this medication (yes/no): yes      Pharmacy name: [facility] listed in chart? (yes/no):  Pharmacy phone number: yes [phone]      Details to clarify the request: Patient needs a refill of Lisinopril 5 milligrams sent to [address]

## 2020-01-27 DIAGNOSIS — G62.9 NEUROPATHY: ICD-10-CM

## 2020-01-30 RX ORDER — GABAPENTIN 300 MG/1
CAPSULE ORAL
Qty: 30 CAP | Refills: 4 | Status: SHIPPED | OUTPATIENT
Start: 2020-01-30 | End: 2020-04-08 | Stop reason: DRUGHIGH

## 2020-04-08 ENCOUNTER — VIRTUAL VISIT (OUTPATIENT)
Dept: ENDOCRINOLOGY | Age: 65
End: 2020-04-08

## 2020-04-08 DIAGNOSIS — G62.9 NEUROPATHY: ICD-10-CM

## 2020-04-08 DIAGNOSIS — E10.65 HYPERGLYCEMIA DUE TO TYPE 1 DIABETES MELLITUS (HCC): ICD-10-CM

## 2020-04-08 DIAGNOSIS — I10 ESSENTIAL HYPERTENSION: ICD-10-CM

## 2020-04-08 DIAGNOSIS — E78.2 MIXED HYPERLIPIDEMIA: ICD-10-CM

## 2020-04-08 DIAGNOSIS — Z79.4 ENCOUNTER FOR LONG-TERM (CURRENT) USE OF INSULIN (HCC): ICD-10-CM

## 2020-04-08 RX ORDER — ATORVASTATIN CALCIUM 40 MG/1
TABLET, FILM COATED ORAL
Qty: 30 TAB | Refills: 6 | Status: SHIPPED | OUTPATIENT
Start: 2020-04-08 | End: 2020-11-10 | Stop reason: SDUPTHER

## 2020-04-08 RX ORDER — METFORMIN HYDROCHLORIDE 1000 MG/1
TABLET ORAL
Qty: 60 TAB | Refills: 6 | Status: SHIPPED | OUTPATIENT
Start: 2020-04-08 | End: 2020-11-24 | Stop reason: SDUPTHER

## 2020-04-08 RX ORDER — LISINOPRIL 5 MG/1
TABLET ORAL
Qty: 30 TAB | Refills: 6 | Status: SHIPPED | OUTPATIENT
Start: 2020-04-08 | End: 2021-08-23 | Stop reason: SDUPTHER

## 2020-04-08 RX ORDER — INSULIN LISPRO 100 [IU]/ML
INJECTION, SOLUTION INTRAVENOUS; SUBCUTANEOUS
Qty: 15 ML | Refills: 6 | Status: SHIPPED | OUTPATIENT
Start: 2020-04-08 | End: 2021-02-19

## 2020-04-08 RX ORDER — INSULIN GLARGINE 100 [IU]/ML
INJECTION, SOLUTION SUBCUTANEOUS
Qty: 15 ML | Refills: 6 | Status: SHIPPED | OUTPATIENT
Start: 2020-04-08 | End: 2020-10-05 | Stop reason: ALTCHOICE

## 2020-04-08 RX ORDER — GABAPENTIN 100 MG/1
CAPSULE ORAL
Qty: 180 CAP | Refills: 5 | Status: SHIPPED | OUTPATIENT
Start: 2020-04-08 | End: 2020-08-14 | Stop reason: SDUPTHER

## 2020-04-08 NOTE — PROGRESS NOTES
1. Have you been to the ER, urgent care clinic since your last visit? No  Hospitalized since your last visit? No    2. Have you seen or consulted any other health care providers outside of the [address] [place] [address] since your last visit? Include any pap smears or colon screening.  No

## 2020-04-08 NOTE — PROGRESS NOTES
Teofilo Soto is a 60 y.o. male evaluated via telephone on 4/8/2020. Consent:  He and/or health care decision maker is aware that that he may receive a bill for this telephone service, depending on his insurance coverage, and has provided verbal consent to proceed: Yes        HISTORY OF PRESENT ILLNESS  Teofilo Soto is a 60 y.o. male. HPI  Patient here for f/u after last visit of Type 1 diabetes mellitus   From  October 2019     No video access     Lost 7 lbs   He checks and is eating right             Old history :    .   Referred : by pcp    H/o diabetes for 3  years     Current A1C is over 9%   and symptoms/problems include polyuria, polydipsia and visual disturbances     Current diabetic medications include metformin, prandin  4 mg TID  , lantus 30 units hs . Current monitoring regimen: home blood tests - daily  Home blood sugar records: trend: fluctuating a bit  Any episodes of hypoglycemia? no    Weight trend: fluctuating a lot  Prior visit with dietician: no  Current diet: \"unhealthy\" diet in general  Current exercise: no regular exercise    Known diabetic complications: retinopathy, nephropathy and peripheral neuropathy  Cardiovascular risk factors: dyslipidemia, diabetes mellitus, sedentary life style, male gender, hypertension    Eye exam current (within one year): yes  SOPHIA: no     Past Medical History:   Diagnosis Date    Diabetes ([facility] [address]      History reviewed. No pertinent surgical history. Current Outpatient Medications   Medication Sig    lisinopril (PRINIVIL, ZESTRIL) 5 mg tablet TAKE ONE TABLET BY MOUTH DAILY- STOP 10 MG DOSE    insulin glargine (BASAGLAR KWIKPEN U-100 INSULIN) 100 unit/mL (3 mL) inpn Inject 20 units at bedtime. Stop Lantus.  metFORMIN (GLUCOPHAGE) 1,000 mg tablet TAKE ONE TABLET BY MOUTH 2 TIMES A DAY(WITH MEALS)    insulin lispro (HUMALOG KWIKPEN INSULIN) 100 unit/mL kwikpen INJECT 1 UNIT BEFORE BREAKFAST, AND 5 UNITS BEFORE LUNCH AND DINNER.  ( PLUS SLIDING SCALE) MAX DAILY DOSE 32 UNITS    atorvastatin (LIPITOR) 40 mg tablet TAKE 1 TABLET BY MOUTH EVERY DAY(STOP PRAVACHOL)    glucose blood VI test strips (ONETOUCH VERIO) strip USE TO CHECK BLOOD SUGAR FOUR TIMES DAILY. Dx code E10.65    GAUDENCIO PEN NEEDLE 32 gauge x 5/32\" ndle USE TO INJECT UP TO FOUR TIMES DAILY    Lancets (ONE TOUCH DELICA) misc Test 4 times daily. Dx code E11.65    glucagon (GLUCAGEN) 1 mg injection 1 mL by IntraMUSCular route as needed for Hypoglycemia.  multivitamin (ONE A DAY) tablet Take 1 tablet by mouth daily.  gabapentin (NEURONTIN) 300 mg capsule TAKE ONE CAPSULE BY MOUTH EVERY EVENING     No current facility-administered medications for this visit. Review of Systems   Constitutional: Negative. HENT: Negative. Eyes: Negative for pain and redness. Respiratory: Negative. Cardiovascular: Negative for chest pain, palpitations and leg swelling. Gastrointestinal: Negative. Negative for constipation. Genitourinary: Negative. Musculoskeletal: Negative for myalgias. Skin: Negative. Neurological: Negative. Endo/Heme/Allergies: Negative. Psychiatric/Behavioral: Negative for depression and memory loss. The patient does not have insomnia.                   Lab Results   Component Value Date/Time    Hemoglobin A1c 7.3 (H) 09/27/2019 08:13 AM    Hemoglobin A1c 7.9 (H) 03/22/2019 08:11 AM    Hemoglobin A1c 7.2 (H) 09/21/2018 08:08 AM    Glucose 135 (H) 09/27/2019 08:13 AM    Glucose  05/11/2018 09:12 AM    Microalb/Creat ratio (ug/mg creat.) 3.4 09/27/2019 08:13 AM    LDL, calculated 68 09/27/2019 08:13 AM    Creatinine 1.06 09/27/2019 08:13 AM      Lab Results   Component Value Date/Time    Cholesterol, total 136 09/27/2019 08:13 AM    HDL Cholesterol 58 09/27/2019 08:13 AM    LDL, calculated 68 09/27/2019 08:13 AM    Triglyceride 48 09/27/2019 08:13 AM     Lab Results   Component Value Date/Time    ALT (SGPT) 17 09/27/2019 08:13 AM    AST (SGOT) 15 09/27/2019 08:13 AM    Alk. phosphatase 58 09/27/2019 08:13 AM    Bilirubin, total 0.3 09/27/2019 08:13 AM     Lab Results   Component Value Date/Time    GFR est AA 87 09/27/2019 08:13 AM    GFR est non-AA 75 09/27/2019 08:13 AM    Creatinine 1.06 09/27/2019 08:13 AM    BUN 13 09/27/2019 08:13 AM    Sodium 140 09/27/2019 08:13 AM    Potassium 4.6 09/27/2019 08:13 AM    Chloride 103 09/27/2019 08:13 AM    CO2 24 09/27/2019 08:13 AM            ASSESSMENT and PLAN  1. Type 1 DM, un controlled :  DIVINA    No new labs    a1c is 7.3 %   From sept 2019    a1c is 7.9 %   From march 2019   a1c is 7.2 %     From sept 2018     comapred to  a1c  7.3 %  Today  May 2018   a1c is 9.6 %   From jan 2018  compared to A1c is  8.5 %     From sept 2017    Compared to    6.2 %     From may 2017   compared to  7.2 %      From jan 2017   Compared to  7.5 %     From sept 2016  compared to  7.1 %     From June 2016  comapred to   7.1 %  By finger stick = 7.5 %    From march 2016  Compared to    8.7 %     From dec 2015 compared to 8 %    From sept 2015      Improved     glycemic control,   DIET better COMPLIANCE   ASKING HIM TO CHECK MORE OFTEN    As the LESLIE is positive and cpep is negligible , he has  DIVINA     Suggested dexcom/freestyle and he is not upto it yet   He needs carb counting   Basic understanding given and he is made to see T-slim CDE    Continue on  lantus to 20 mg and humalog   He says he used to do so good on metformin plain , not ER , I stopped it because of him being DIVINA and that made his sugars go high  He is back onto it and doing better       Discussed pump option, not very favorable   Patient is advised about checking blood sugars 4 times a day and maintaining log book. 2. Hypoglycemia :  Educated on treating the hypoglycemia. Discussed insulin use      3. HTN : continue prinivil 5 mg  . Patient is educated about importance of compliance with anti-hypertensives especially ARB/ACEI    4.   Dyslipidemia : continue pravachol. Patient is educated about benefits and adverse effects of statins and explained how benefits outweigh risk. 5. use of aspirin to prevent MI and TIA's discussed    6. Neuropathy  : increasing symptoms    neurontin increased to  200 mg am and 400 mg hs      Telephone time 11 min          Pursuant  To the Emergency declaration under the [address] and the [address] and the [facility], to reduce the patient's risk of exposure to  COVID-19 and provide continuity of care for an established patient.       > 50 % of time is spent on counseling   Patient voiced understanding her plan of care

## 2020-04-08 NOTE — PATIENT INSTRUCTIONS
Increase   on  neurontin  200 mg  One pill am  And  2 pills  At night, stop 300 mg dose  
 
 lipitor 40 mg at night  
 
 
 metformin  Plain 1000 mg one pill twice a day with meals ( he likes plain) Check blood sugars immediately before each meal and at bedtime 
 
 
continue   Lantus insulin  20  units  at bed time Take  humalog 1 unit with b-fast  , 5 units with lunch  And 5 units with dinner OR Carb to insulin ratio 8 gm : 1 unit Take additional humalog  as folllows with meals: 
 
150-200 mg 1 units 201-250 mg 2 units 251-300 mg 3 units 301-350 mg 4 units 351-400 mg 5 units 401-450 mg 6 units 451-500 mg 7 units 
 
 
 
lisinopril to 5 mg a day Do not skip meals Do not eat in between meals Reduce carbs- pasta, rice, potatoes, bread Do not drink juices or sodas Donot eat peanut butter Do not eat sugar free cookies and cakes Do not eat peaches, grapes , pine apples and oranges

## 2020-07-06 RX ORDER — PEN NEEDLE, DIABETIC 32GX 5/32"
NEEDLE, DISPOSABLE MISCELLANEOUS
Qty: 200 PEN NEEDLE | Refills: 2 | Status: SHIPPED | OUTPATIENT
Start: 2020-07-06 | End: 2021-01-18

## 2020-07-14 ENCOUNTER — TELEPHONE (OUTPATIENT)
Dept: ENDOCRINOLOGY | Age: 65
End: 2020-07-14

## 2020-07-14 DIAGNOSIS — E10.65 HYPERGLYCEMIA DUE TO TYPE 1 DIABETES MELLITUS (HCC): ICD-10-CM

## 2020-07-14 DIAGNOSIS — E78.2 MIXED HYPERLIPIDEMIA: ICD-10-CM

## 2020-07-14 DIAGNOSIS — E10.65 HYPERGLYCEMIA DUE TO TYPE 1 DIABETES MELLITUS (HCC): Primary | ICD-10-CM

## 2020-07-14 NOTE — TELEPHONE ENCOUNTER
Order placed for pt per verbal order with read back from Dr. Memory Lennox 07/14/20    Lab slips mailed

## 2020-08-14 ENCOUNTER — OFFICE VISIT (OUTPATIENT)
Dept: ENDOCRINOLOGY | Age: 65
End: 2020-08-14
Payer: COMMERCIAL

## 2020-08-14 VITALS
RESPIRATION RATE: 18 BRPM | WEIGHT: 225.9 LBS | SYSTOLIC BLOOD PRESSURE: 142 MMHG | BODY MASS INDEX: 30.6 KG/M2 | TEMPERATURE: 98.1 F | HEART RATE: 84 BPM | OXYGEN SATURATION: 100 % | HEIGHT: 72 IN | DIASTOLIC BLOOD PRESSURE: 76 MMHG

## 2020-08-14 DIAGNOSIS — E10.65 HYPERGLYCEMIA DUE TO TYPE 1 DIABETES MELLITUS (HCC): ICD-10-CM

## 2020-08-14 DIAGNOSIS — Z79.4 TYPE 2 DIABETES MELLITUS WITH DIABETIC NEUROPATHY, WITH LONG-TERM CURRENT USE OF INSULIN (HCC): ICD-10-CM

## 2020-08-14 DIAGNOSIS — E78.2 MIXED HYPERLIPIDEMIA: ICD-10-CM

## 2020-08-14 DIAGNOSIS — E11.40 TYPE 2 DIABETES MELLITUS WITH DIABETIC NEUROPATHY, WITH LONG-TERM CURRENT USE OF INSULIN (HCC): ICD-10-CM

## 2020-08-14 DIAGNOSIS — E10.65 HYPERGLYCEMIA DUE TO TYPE 1 DIABETES MELLITUS (HCC): Primary | ICD-10-CM

## 2020-08-14 DIAGNOSIS — Z79.4 ENCOUNTER FOR LONG-TERM (CURRENT) USE OF INSULIN (HCC): ICD-10-CM

## 2020-08-14 DIAGNOSIS — G62.9 NEUROPATHY: ICD-10-CM

## 2020-08-14 LAB — HBA1C MFR BLD HPLC: 7.8 %

## 2020-08-14 PROCEDURE — 83036 HEMOGLOBIN GLYCOSYLATED A1C: CPT | Performed by: INTERNAL MEDICINE

## 2020-08-14 PROCEDURE — 99214 OFFICE O/P EST MOD 30 MIN: CPT | Performed by: INTERNAL MEDICINE

## 2020-08-14 PROCEDURE — 3052F HG A1C>EQUAL 8.0%<EQUAL 9.0%: CPT | Performed by: INTERNAL MEDICINE

## 2020-08-14 RX ORDER — GABAPENTIN 100 MG/1
CAPSULE ORAL
Qty: 90 CAP | Refills: 5
Start: 2020-08-14 | End: 2020-10-27 | Stop reason: SDUPTHER

## 2020-08-14 NOTE — PROGRESS NOTES
1. Have you been to the ER, urgent care clinic since your last visit? No  Hospitalized since your last visit? No    2. Have you seen or consulted any other health care providers outside of the [address] [place] [address] since your last visit? Include any pap smears or colon screening. No    Wt Readings from Last 3 Encounters:   08/14/20 225 lb 14.4 oz (102.5 kg)   10/04/19 223 lb 8 oz (101.4 kg)   03/29/19 230 lb 8 oz (104.6 kg)     Temp Readings from Last 3 Encounters:   08/14/20 98.1 °F (36.7 °C) (Oral)   10/04/19 97.2 °F (36.2 °C) (Oral)   03/29/19 97.6 °F (36.4 °C) (Oral)     BP Readings from Last 3 Encounters:   08/14/20 142/76   10/04/19 130/85   03/29/19 148/88     Pulse Readings from Last 3 Encounters:   08/14/20 84   10/04/19 84   03/29/19 86     Lab Results   Component Value Date/Time    Hemoglobin A1c 7.3 (H) 09/27/2019 08:13 AM    Hemoglobin A1c (POC) 7.3 05/11/2018 09:11 AM     Patient has meter today.

## 2020-08-14 NOTE — PATIENT INSTRUCTIONS
neurontin  100 mg  One pill am  And  2 pills  At night,  
 
 lipitor 40 mg at night  
 
 
 metformin  Plain 1000 mg one pill twice a day with meals ( he likes plain) Check blood sugars immediately before each meal and at bedtime 
 
 
continue   Lantus insulin  20  units  at bed time Take  humalog 1 unit with b-fast  , 5 units with lunch  And 5 units with dinner OR Carb to insulin ratio 8 gm : 1 unit Take additional humalog  as folllows with meals: 
 
150-200 mg 1 units 201-250 mg 2 units 251-300 mg 3 units 301-350 mg 4 units 351-400 mg 5 units 401-450 mg 6 units 451-500 mg 7 units 
 
 
 
lisinopril to 5 mg a day Do not skip meals Do not eat in between meals Reduce carbs- pasta, rice, potatoes, bread Do not drink juices or sodas Donot eat peanut butter Do not eat sugar free cookies and cakes Do not eat peaches, grapes , pine apples and oranges

## 2020-08-14 NOTE — PROGRESS NOTES
HISTORY OF PRESENT ILLNESS  Karen Dailey is a 60 y.o. male. HPI  Patient here for f/u after last visit of Type 1 diabetes mellitus   From  April 2020         Gained few lbs   His pain is lot better since he got the medication for neuropathy    Not diet compliant         Old history :    .   Referred : by pcp    H/o diabetes for 3  years     Current A1C is over 9%   and symptoms/problems include polyuria, polydipsia and visual disturbances     Current diabetic medications include metformin, prandin  4 mg TID  , lantus 30 units hs . Current monitoring regimen: home blood tests - daily  Home blood sugar records: trend: fluctuating a bit  Any episodes of hypoglycemia? no    Weight trend: fluctuating a lot  Prior visit with dietician: no  Current diet: \"unhealthy\" diet in general  Current exercise: no regular exercise    Known diabetic complications: retinopathy, nephropathy and peripheral neuropathy  Cardiovascular risk factors: dyslipidemia, diabetes mellitus, sedentary life style, male gender, hypertension    Eye exam current (within one year): yes  SOPHIA: no     Past Medical History:   Diagnosis Date    [facility]      History reviewed. No pertinent surgical history. Current Outpatient Medications   Medication Sig    Emelina Pen Needle 32 gauge x 5/32\" ndle USE FOUR TIMES DAILY AS DIRECTED    gabapentin (NEURONTIN) 100 mg capsule One pill AM and 2 pills at night STOP 300 MG DOSE    metFORMIN (GLUCOPHAGE) 1,000 mg tablet TAKE ONE TABLET BY MOUTH 2 TIMES A DAY(WITH MEALS)    lisinopriL (PRINIVIL, ZESTRIL) 5 mg tablet TAKE ONE TABLET BY MOUTH DAILY- STOP 10 MG DOSE    insulin lispro (HumaLOG KwikPen Insulin) 100 unit/mL kwikpen INJECT 1 UNIT BEFORE BREAKFAST, AND 5 UNITS BEFORE LUNCH AND DINNER. ( PLUS SLIDING SCALE) MAX DAILY DOSE 32 UNITS    insulin glargine (Basaglar KwikPen U-100 Insulin) 100 unit/mL (3 mL) inpn Inject 20 units at bedtime. Stop Lantus.     atorvastatin (LIPITOR) 40 mg tablet TAKE 1 TABLET BY MOUTH EVERY night (STOP PRAVACHOL)    glucose blood VI test strips (ONETOUCH VERIO) strip USE TO CHECK BLOOD SUGAR FOUR TIMES DAILY. Dx code E10.65    Lancets (ONE TOUCH DELICA) misc Test 4 times daily. Dx code E11.65    glucagon (GLUCAGEN) 1 mg injection 1 mL by IntraMUSCular route as needed for Hypoglycemia.  multivitamin (ONE A DAY) tablet Take 1 tablet by mouth daily. No current facility-administered medications for this visit. Review of Systems   Constitutional: Negative. HENT: Negative. Eyes: Negative for pain and redness. Respiratory: Negative. Cardiovascular: Negative for chest pain, palpitations and leg swelling. Gastrointestinal: Negative. Negative for constipation. Genitourinary: Negative. Musculoskeletal: Negative for myalgias. Skin: Negative. Neurological: Negative. Endo/Heme/Allergies: Negative. Psychiatric/Behavioral: Negative for depression and memory loss. The patient does not have insomnia. No new labs     ASSESSMENT and PLAN  1.  Type 1 DM, un controlled :  DIVINA    No new labs  Done in between  Since sept 2019  Because of the pandemic     a1c  Is 7.8 %  Today by POC     a1c is 7.3 %   From sept 2019    a1c is 7.9 %   From march 2019   a1c is 7.2 %     From sept 2018     comapred to  a1c  7.3 %  Today  May 2018   a1c is 9.6 %   From jan 2018  compared to A1c is  8.5 %     From sept 2017    Compared to    6.2 %     From may 2017   compared to  7.2 %      From jan 2017   Compared to  7.5 %     From sept 2016  compared to  7.1 %     From June 2016  comapred to   7.1 %  By finger stick = 7.5 %    From march 2016  Compared to    8.7 %     From dec 2015 compared to 8 %    From sept 2015    Await a1c  Slight loss of  glycemic control,   DIET could be better   ASKING HIM TO CHECK MORE OFTEN    Continue on  lantus to 20 mg and humalog and metformin       Old  A/ p  He says he used to do so good on metformin plain , not ER , I stopped it because of him being DIVINA and that made his sugars go high  He is back onto it and doing better       Discussed pump option, not very favorable   So is with [facility]  Patient is advised about checking blood sugars 4 times a day and maintaining log book. 2. Hypoglycemia :  Educated on treating the hypoglycemia. Discussed insulin use and gave glucagon      3. HTN : continue prinivil 5 mg  . Patient is educated about importance of compliance with anti-hypertensives especially ARB/ACEI    4. Dyslipidemia : continue pravachol. Patient is educated about benefits and adverse effects of statins and explained how benefits outweigh risk. 5. use of aspirin to prevent MI and TIA's discussed    6.  Neuropathy  : lot better since  neurontin increased to  200 mg am and 400 mg hs        > 50 % of time is spent on counseling   Patient voiced understanding her plan of care

## 2020-08-15 LAB
ALBUMIN SERPL-MCNC: 4 G/DL (ref 3.8–4.8)
ALBUMIN/CREAT UR: 6 MG/G CREAT (ref 0–29)
ALBUMIN/GLOB SERPL: 1.3 {RATIO} (ref 1.2–2.2)
ALP SERPL-CCNC: 68 IU/L (ref 39–117)
ALT SERPL-CCNC: 13 IU/L (ref 0–44)
AST SERPL-CCNC: 19 IU/L (ref 0–40)
BILIRUB SERPL-MCNC: 0.6 MG/DL (ref 0–1.2)
BUN SERPL-MCNC: 13 MG/DL (ref 8–27)
BUN/CREAT SERPL: 11 (ref 10–24)
CALCIUM SERPL-MCNC: 9.5 MG/DL (ref 8.6–10.2)
CHLORIDE SERPL-SCNC: 102 MMOL/L (ref 96–106)
CO2 SERPL-SCNC: 23 MMOL/L (ref 20–29)
CREAT SERPL-MCNC: 1.21 MG/DL (ref 0.76–1.27)
CREAT UR-MCNC: 271.9 MG/DL
EST. AVERAGE GLUCOSE BLD GHB EST-MCNC: 189 MG/DL
GLOBULIN SER CALC-MCNC: 3 G/DL (ref 1.5–4.5)
GLUCOSE SERPL-MCNC: 146 MG/DL (ref 65–99)
HBA1C MFR BLD: 8.2 % (ref 4.8–5.6)
MICROALBUMIN UR-MCNC: 15 UG/ML
POTASSIUM SERPL-SCNC: 4.8 MMOL/L (ref 3.5–5.2)
PROT SERPL-MCNC: 7 G/DL (ref 6–8.5)
SODIUM SERPL-SCNC: 140 MMOL/L (ref 134–144)

## 2020-10-05 RX ORDER — INSULIN GLARGINE 100 [IU]/ML
INJECTION, SOLUTION SUBCUTANEOUS
Qty: 15 ML | Refills: 6 | Status: SHIPPED | OUTPATIENT
Start: 2020-10-05 | End: 2021-08-23 | Stop reason: SDUPTHER

## 2020-10-27 DIAGNOSIS — G62.9 NEUROPATHY: ICD-10-CM

## 2020-10-27 RX ORDER — GABAPENTIN 100 MG/1
CAPSULE ORAL
Qty: 90 CAP | Refills: 5 | Status: SHIPPED | OUTPATIENT
Start: 2020-10-27 | End: 2021-08-23 | Stop reason: ALTCHOICE

## 2020-10-27 NOTE — TELEPHONE ENCOUNTER
Need new RX. Rx start date pharmacy had 2020. RX . New new RX. PCP: Bakari Diego,     Last appt: 2020  Future Appointments   Date Time Provider Heidi Mccarthy   2021  8:00 AM SPEC CDE LAB CDE BS AMB   2021  9:00 AM Dyana Wang MD CDE BS AMB       Requested Prescriptions     Pending Prescriptions Disp Refills    gabapentin (NEURONTIN) 100 mg capsule 90 Cap 5     Sig: One pill AM and 2 pills at night STOP 300 MG DOSE     Office Visit on 2020   Component Date Value Ref Range Status    Hemoglobin A1c (POC) 2020 7.8  % Final    Glucose 2020 146* 65 - 99 mg/dL Final    BUN 2020 13  8 - 27 mg/dL Final    Creatinine 2020 1.21  0.76 - 1.27 mg/dL Final    GFR est non-AA 2020 64  >59 mL/min/1.73 Final    GFR est AA 2020 74  >59 mL/min/1.73 Final    BUN/Creatinine ratio 2020 11  10 - 24 Final    Sodium 2020 140  134 - 144 mmol/L Final    Potassium 2020 4.8  3.5 - 5.2 mmol/L Final    Chloride 2020 102  96 - 106 mmol/L Final    CO2 2020 23  20 - 29 mmol/L Final    Calcium 2020 9.5  8.6 - 10.2 mg/dL Final    Protein, total 2020 7.0  6.0 - 8.5 g/dL Final    Albumin 2020 4.0  3.8 - 4.8 g/dL Final    GLOBULIN, TOTAL 2020 3.0  1.5 - 4.5 g/dL Final    A-G Ratio 2020 1.3  1.2 - 2.2 Final    Bilirubin, total 2020 0.6  0.0 - 1.2 mg/dL Final    Alk.  phosphatase 2020 68  39 - 117 IU/L Final    AST (SGOT) 2020 19  0 - 40 IU/L Final    ALT (SGPT) 2020 13  0 - 44 IU/L Final    Creatinine, urine 2020 271.9  Not Estab. mg/dL Final    Microalbumin, urine 2020 15.0  Not Estab. ug/mL Final    Microalb/Creat ratio (ug/mg creat.) 2020 6  0 - 29 mg/g creat Final    Comment:                        Normal:                0 -  29                         Moderately increased: 30 - 300                         Severely increased:       >300 **Please note reference interval change**      Hemoglobin A1c 08/14/2020 8.2* 4.8 - 5.6 % Final    Comment:          Prediabetes: 5.7 - 6.4           Diabetes: >6.4           Glycemic control for adults with diabetes: <7.0      Estimated average glucose 08/14/2020 189  mg/dL Final

## 2020-11-10 RX ORDER — ATORVASTATIN CALCIUM 40 MG/1
TABLET, FILM COATED ORAL
Qty: 30 TAB | Refills: 6 | Status: SHIPPED | OUTPATIENT
Start: 2020-11-10 | End: 2021-02-19 | Stop reason: SDUPTHER

## 2020-11-24 DIAGNOSIS — E10.65 HYPERGLYCEMIA DUE TO TYPE 1 DIABETES MELLITUS (HCC): ICD-10-CM

## 2020-11-24 DIAGNOSIS — E78.2 MIXED HYPERLIPIDEMIA: ICD-10-CM

## 2020-11-24 DIAGNOSIS — Z79.4 ENCOUNTER FOR LONG-TERM (CURRENT) USE OF INSULIN (HCC): ICD-10-CM

## 2020-11-24 DIAGNOSIS — I10 ESSENTIAL HYPERTENSION: ICD-10-CM

## 2020-11-24 RX ORDER — METFORMIN HYDROCHLORIDE 1000 MG/1
TABLET ORAL
Qty: 60 TAB | Refills: 6 | Status: SHIPPED | OUTPATIENT
Start: 2020-11-24 | End: 2021-06-28

## 2020-12-09 DIAGNOSIS — E10.65 HYPERGLYCEMIA DUE TO TYPE 1 DIABETES MELLITUS (HCC): ICD-10-CM

## 2020-12-09 DIAGNOSIS — E78.2 MIXED HYPERLIPIDEMIA: ICD-10-CM

## 2020-12-09 DIAGNOSIS — I10 ESSENTIAL HYPERTENSION: ICD-10-CM

## 2020-12-09 DIAGNOSIS — Z79.4 ENCOUNTER FOR LONG-TERM (CURRENT) USE OF INSULIN (HCC): ICD-10-CM

## 2020-12-09 RX ORDER — BLOOD SUGAR DIAGNOSTIC
STRIP MISCELLANEOUS
Qty: 200 STRIP | Refills: 6 | Status: SHIPPED | OUTPATIENT
Start: 2020-12-09 | End: 2021-08-23 | Stop reason: SDUPTHER

## 2020-12-23 RX ORDER — LISINOPRIL 10 MG/1
TABLET ORAL
Qty: 15 TAB | Refills: 3 | Status: SHIPPED | OUTPATIENT
Start: 2020-12-23 | End: 2021-04-26

## 2021-01-18 RX ORDER — PEN NEEDLE, DIABETIC 32GX 5/32"
NEEDLE, DISPOSABLE MISCELLANEOUS
Qty: 200 PEN NEEDLE | Refills: 3 | Status: SHIPPED | OUTPATIENT
Start: 2021-01-18 | End: 2021-08-23 | Stop reason: SDUPTHER

## 2021-02-19 ENCOUNTER — OFFICE VISIT (OUTPATIENT)
Dept: ENDOCRINOLOGY | Age: 66
End: 2021-02-19
Payer: COMMERCIAL

## 2021-02-19 VITALS
HEART RATE: 85 BPM | DIASTOLIC BLOOD PRESSURE: 86 MMHG | BODY MASS INDEX: 31.34 KG/M2 | TEMPERATURE: 96.7 F | HEIGHT: 72 IN | SYSTOLIC BLOOD PRESSURE: 172 MMHG | WEIGHT: 231.4 LBS | RESPIRATION RATE: 18 BRPM | OXYGEN SATURATION: 98 %

## 2021-02-19 DIAGNOSIS — E78.2 MIXED HYPERLIPIDEMIA: ICD-10-CM

## 2021-02-19 DIAGNOSIS — E10.65 HYPERGLYCEMIA DUE TO TYPE 1 DIABETES MELLITUS (HCC): Primary | ICD-10-CM

## 2021-02-19 DIAGNOSIS — G62.9 NEUROPATHY: ICD-10-CM

## 2021-02-19 DIAGNOSIS — I10 ESSENTIAL HYPERTENSION: ICD-10-CM

## 2021-02-19 DIAGNOSIS — Z79.4 ENCOUNTER FOR LONG-TERM (CURRENT) USE OF INSULIN (HCC): ICD-10-CM

## 2021-02-19 PROCEDURE — 3051F HG A1C>EQUAL 7.0%<8.0%: CPT | Performed by: INTERNAL MEDICINE

## 2021-02-19 PROCEDURE — 99214 OFFICE O/P EST MOD 30 MIN: CPT | Performed by: INTERNAL MEDICINE

## 2021-02-19 RX ORDER — ATORVASTATIN CALCIUM 40 MG/1
TABLET, FILM COATED ORAL
Qty: 30 TAB | Refills: 6 | Status: SHIPPED | OUTPATIENT
Start: 2021-02-19 | End: 2021-08-23 | Stop reason: SDUPTHER

## 2021-02-19 RX ORDER — INSULIN LISPRO 100 [IU]/ML
INJECTION, SOLUTION INTRAVENOUS; SUBCUTANEOUS
Qty: 15 ML | Refills: 6 | Status: SHIPPED | OUTPATIENT
Start: 2021-02-19 | End: 2021-08-23 | Stop reason: SDUPTHER

## 2021-02-19 NOTE — PATIENT INSTRUCTIONS
SPECIFIC INSTRUCTIONS BELOW            neurontin  100 mg  One pill am  And  2 pills  At night,      lipitor 40 mg at night        metformin  Plain 1000 mg one pill twice a day with meals ( he likes plain)    Check blood sugars immediately before each meal and at bedtime      continue   Lantus insulin  20  units  at bed time    Take  humalog 2-4  unit with b-fast  , 5 to 8 units with lunch  And 5 to 15  units with dinner  Based on carbs   To max dose of 50 units a day       Take additional humalog  as folllows with meals:    150-200 mg 1 units    201-250 mg 2 units    251-300 mg 3 units    301-350 mg 4 units    351-400 mg 5 units    401-450 mg 6 units    451-500 mg 7 units        lisinopril to 5 mg a day         Do not skip meals  Do not eat in between meals    Reduce carbs- pasta, rice, potatoes, bread   Do not drink juices or sodas  Donot eat peanut butter     Do not eat sugar free cookies and cakes   Do not eat peaches, grapes , pine apples and oranges           PAY ATTENTION TO THESE GENERAL INSTRUCTIONS     - HEALTH MAINTENANCE IS NOT GOING TO BE UP TO DATE ON YOUR AVS- PLEASE IGNORE   - YOUR MED LIST IS NOT UP TO DATE AS SOME CHANGES ARE BEING MADE AFTER THE VISIT - FOLLOW SPECIFIC INSTRUCTIONS ABOVE     Results     *Normal results will not be notified by a phone call starting January 1 2021   *If you have an upcoming visit, the results will be discussed at the visit   *Please sign up for MY CHART if you want access to your lab and test results  *Abnormal results which require immediate attention will be notified by phone call   *Abnormal results which do not require immediate assistance will be notified in 1-2 weeks       Refills    -    have your pharmacy send us a refill request  Phone calls  -  Allow  24 hrs.  for non-urgent calls to be returned  Prior authorization - It may take 2-4 weeks to process  Forms  -  FMLA, DMV etc., will take up to 2 weeks to process  Cancellations - please notify the office 2 days in advance   Samples  - will only be dispensed at visits     --------------------------------------------------------------------------------------------

## 2021-02-19 NOTE — LETTER
2/21/2021 Patient: Radha Wilson YOB: 1955 Date of Visit: 2/19/2021 Reza Miner, [address] [address] [address] [phone] Via Fax: [phone] Dear Reza Miner DO, Thank you for referring Mr. Martha Chavez to [address] [place] [address] for evaluation. My notes for this consultation are attached. If you have questions, please do not hesitate to call me. I look forward to following your patient along with you. Sincerely, Eugenio Blackman MD

## 2021-02-19 NOTE — PROGRESS NOTES
1. Have you been to the ER, urgent care clinic since your last visit? No  Hospitalized since your last visit? No    2. Have you seen or consulted any other health care providers outside of the [address] [place] [address] since your last visit? Include any pap smears or colon screening. No    Wt Readings from Last 3 Encounters:   02/19/21 231 lb 6.4 oz (105 kg)   08/14/20 225 lb 14.4 oz (102.5 kg)   10/04/19 223 lb 8 oz (101.4 kg)     Temp Readings from Last 3 Encounters:   02/19/21 (!) 96.7 °F (35.9 °C) (Oral)   08/14/20 98.1 °F (36.7 °C) (Oral)   10/04/19 97.2 °F (36.2 °C) (Oral)     BP Readings from Last 3 Encounters:   02/19/21 (!) 172/86   08/14/20 142/76   10/04/19 130/85     Pulse Readings from Last 3 Encounters:   02/19/21 85   08/14/20 84   10/04/19 84     Lab Results   Component Value Date/Time    Hemoglobin A1c 7.6 (H) 02/11/2021 08:10 AM    Hemoglobin A1c (POC) 7.8 08/14/2020 10:20 AM     Patient has Onetouch verio meter today.

## 2021-02-19 NOTE — PROGRESS NOTES
HISTORY OF PRESENT ILLNESS  Cesar Izquierdo is a 63 y.o. male. HPI  Patient here for f/u after last visit of Type 1 diabetes mellitus   From  August 2020      Pt is sincere, tries his best to keep sguars in range       August 2020     Gained few lbs   His pain is lot better since he got the medication for neuropathy  Not diet compliant         Old history :    Referred : by pcp  H/o diabetes for 3  years  Current A1C is over 9%   and symptoms/problems include polyuria, polydipsia and visual disturbances   Current diabetic medications include metformin, prandin  4 mg TID  , lantus 30 units hs . Review of Systems   Constitutional: Negative. Psychiatric/Behavioral: Negative for depression and memory loss. The patient does not have insomnia. Phy exam   NAD    Lab Results   Component Value Date/Time    Hemoglobin A1c 7.6 (H) 02/11/2021 08:10 AM    Hemoglobin A1c 8.2 (H) 08/14/2020 11:39 AM    Hemoglobin A1c 7.3 (H) 09/27/2019 08:13 AM    Glucose 135 (H) 02/11/2021 08:10 AM    Glucose  05/11/2018 09:12 AM    Microalbumin/Creat ratio (mg/g creat)  02/11/2021 08:10 AM     Cannot calculate ratio due to microalbumin result outside reportable range. Microalbumin,urine random <0.50 02/11/2021 08:10 AM    LDL, calculated 61.4 02/11/2021 08:10 AM    Creatinine 1.13 02/11/2021 08:10 AM      Lab Results   Component Value Date/Time    Cholesterol, total 134 02/11/2021 08:10 AM    HDL Cholesterol 61 02/11/2021 08:10 AM    LDL, calculated 61.4 02/11/2021 08:10 AM    Triglyceride 58 02/11/2021 08:10 AM    CHOL/HDL Ratio 2.2 02/11/2021 08:10 AM     Lab Results   Component Value Date/Time    ALT (SGPT) 25 02/11/2021 08:10 AM    Alk.  phosphatase 76 02/11/2021 08:10 AM    Bilirubin, total 0.8 02/11/2021 08:10 AM    Albumin 3.6 02/11/2021 08:10 AM    Protein, total 7.3 02/11/2021 08:10 AM     Lab Results   Component Value Date/Time    GFR est non-AA >60 02/11/2021 08:10 AM    GFR est AA >60 02/11/2021 08:10 AM Creatinine 1.13 02/11/2021 08:10 AM    BUN 11 02/11/2021 08:10 AM    Sodium 138 02/11/2021 08:10 AM    Potassium 4.2 02/11/2021 08:10 AM    Chloride 106 02/11/2021 08:10 AM    CO2 28 02/11/2021 08:10 AM           ASSESSMENT and PLAN  1. Type 1 DM, un controlled :  DIVINA    a1c  Is 7.6 %  Feb 2021  Compared to  8.3 %   From august 2020  Compared to a1c is 7.3 %   From sept 2019    a1c is 7.9 %   From march 2019 ; a1c is 7.2 %     From sept 2018     comapred to  a1c  7.3 %  Today  May 2018   a1c is 9.6 %   From jan 2018  compared to A1c is  8.5 %     From sept 2017    Compared to    6.2 %     From may 2017   compared to  7.2 %      From jan 2017       Slight improved   glycemic control,   DIET could be better   Continue on  lantus to 20 mg and humalog and metformin   He says he used to do so good on metformin plain , not ER , I stopped it because of him being DIVINA and that made his sugars go high  He is back onto it and doing better       Discussed pump option, not very favorable ; So is with [facility]  Patient is advised about checking blood sugars 4 times a day and maintaining log book. 2. Hypoglycemia :  Educated on treating the hypoglycemia. Discussed insulin use and gave glucagon      3. HTN : continue prinivil 5 mg  . Patient is educated about importance of compliance with anti-hypertensives especially ARB/ACEI    4. Dyslipidemia : continue pravachol. Patient is educated about benefits and adverse effects of statins and explained how benefits outweigh risk. 5. use of aspirin to prevent MI and TIA's discussed    6.  Neuropathy  : lot better since  neurontin USE, BUT HE DOES NOT USE IT REGULARLY  STAY ON   200 mg am and 400 mg hs          Reviewed results with patient and discussed the labs being ordered today/bnv  Patient voiced understanding of plan of care

## 2021-04-26 RX ORDER — LISINOPRIL 10 MG/1
TABLET ORAL
Qty: 15 TAB | Refills: 3 | Status: SHIPPED | OUTPATIENT
Start: 2021-04-26 | End: 2022-02-25 | Stop reason: DRUGHIGH

## 2021-06-28 DIAGNOSIS — Z79.4 ENCOUNTER FOR LONG-TERM (CURRENT) USE OF INSULIN (HCC): ICD-10-CM

## 2021-06-28 DIAGNOSIS — E78.2 MIXED HYPERLIPIDEMIA: ICD-10-CM

## 2021-06-28 DIAGNOSIS — E10.65 HYPERGLYCEMIA DUE TO TYPE 1 DIABETES MELLITUS (HCC): ICD-10-CM

## 2021-06-28 DIAGNOSIS — I10 ESSENTIAL HYPERTENSION: ICD-10-CM

## 2021-06-28 RX ORDER — METFORMIN HYDROCHLORIDE 1000 MG/1
TABLET ORAL
Qty: 60 TABLET | Refills: 6 | Status: SHIPPED | OUTPATIENT
Start: 2021-06-28 | End: 2022-01-28 | Stop reason: SDUPTHER

## 2021-08-23 ENCOUNTER — OFFICE VISIT (OUTPATIENT)
Dept: ENDOCRINOLOGY | Age: 66
End: 2021-08-23
Payer: COMMERCIAL

## 2021-08-23 VITALS
OXYGEN SATURATION: 98 % | DIASTOLIC BLOOD PRESSURE: 80 MMHG | TEMPERATURE: 97.2 F | SYSTOLIC BLOOD PRESSURE: 161 MMHG | HEART RATE: 82 BPM | WEIGHT: 223.6 LBS | RESPIRATION RATE: 20 BRPM | BODY MASS INDEX: 30.28 KG/M2 | HEIGHT: 72 IN

## 2021-08-23 DIAGNOSIS — Z79.4 ENCOUNTER FOR LONG-TERM (CURRENT) USE OF INSULIN (HCC): ICD-10-CM

## 2021-08-23 DIAGNOSIS — I10 ESSENTIAL HYPERTENSION: ICD-10-CM

## 2021-08-23 DIAGNOSIS — E78.2 MIXED HYPERLIPIDEMIA: ICD-10-CM

## 2021-08-23 DIAGNOSIS — E10.65 HYPERGLYCEMIA DUE TO TYPE 1 DIABETES MELLITUS (HCC): ICD-10-CM

## 2021-08-23 DIAGNOSIS — E10.65 HYPERGLYCEMIA DUE TO TYPE 1 DIABETES MELLITUS (HCC): Primary | ICD-10-CM

## 2021-08-23 DIAGNOSIS — G62.9 NEUROPATHY: ICD-10-CM

## 2021-08-23 PROCEDURE — 3051F HG A1C>EQUAL 7.0%<8.0%: CPT | Performed by: INTERNAL MEDICINE

## 2021-08-23 PROCEDURE — 99214 OFFICE O/P EST MOD 30 MIN: CPT | Performed by: INTERNAL MEDICINE

## 2021-08-23 RX ORDER — LANCETS
EACH MISCELLANEOUS
Qty: 200 EACH | Refills: 6 | Status: SHIPPED | OUTPATIENT
Start: 2021-08-23

## 2021-08-23 RX ORDER — BLOOD SUGAR DIAGNOSTIC
STRIP MISCELLANEOUS
Qty: 200 STRIP | Refills: 6 | Status: SHIPPED | OUTPATIENT
Start: 2021-08-23 | End: 2022-08-01 | Stop reason: SDUPTHER

## 2021-08-23 RX ORDER — INSULIN GLARGINE 100 [IU]/ML
INJECTION, SOLUTION SUBCUTANEOUS
Qty: 15 ML | Refills: 6 | Status: SHIPPED | OUTPATIENT
Start: 2021-08-23 | End: 2022-02-25 | Stop reason: SDUPTHER

## 2021-08-23 RX ORDER — INSULIN LISPRO 100 [IU]/ML
INJECTION, SOLUTION INTRAVENOUS; SUBCUTANEOUS
Qty: 15 ML | Refills: 6 | Status: SHIPPED | OUTPATIENT
Start: 2021-08-23 | End: 2022-01-27 | Stop reason: SDUPTHER

## 2021-08-23 RX ORDER — PEN NEEDLE, DIABETIC 31 GX3/16"
NEEDLE, DISPOSABLE MISCELLANEOUS
Qty: 200 PEN NEEDLE | Refills: 11 | Status: SHIPPED | OUTPATIENT
Start: 2021-08-23 | End: 2021-10-11

## 2021-08-23 RX ORDER — LISINOPRIL 5 MG/1
TABLET ORAL
Qty: 30 TABLET | Refills: 6 | Status: SHIPPED | OUTPATIENT
Start: 2021-08-23 | End: 2022-02-25 | Stop reason: SDUPTHER

## 2021-08-23 RX ORDER — ATORVASTATIN CALCIUM 40 MG/1
TABLET, FILM COATED ORAL
Qty: 30 TABLET | Refills: 6 | Status: SHIPPED | OUTPATIENT
Start: 2021-08-23 | End: 2022-02-25 | Stop reason: SDUPTHER

## 2021-08-23 NOTE — LETTER
8/25/2021    Patient: Nano Pu   YOB: 1955   Date of Visit: 8/23/2021     [facility] [address] [place] [id number]  Via Fax: [phone]    Dear Frederic Tapia DO,      Thank you for referring Mr. Moody Morrison to [address] [place] [address] for evaluation. My notes for this consultation are attached. If you have questions, please do not hesitate to call me. I look forward to following your patient along with you.       Sincerely,    Chapito Sanchez MD

## 2021-08-23 NOTE — PROGRESS NOTES
HISTORY OF PRESENT ILLNESS  Mariaelena Finnegan is a 63 y.o. male. HPI  Patient here for f/u after last visit of Type 1 diabetes mellitus   From  Feb 2021     Checks intermittently       Feb 201     Pt is sincere, tries his best to keep sguars in range         Old history :    Referred : by pcp  H/o diabetes for 3  years  Current A1C is over 9%   and symptoms/problems include polyuria, polydipsia and visual disturbances   Current diabetic medications include metformin, prandin  4 mg TID  , lantus 30 units hs . Review of Systems   Constitutional: Negative. Psychiatric/Behavioral: Negative for depression and memory loss. The patient does not have insomnia. Phy exam   NAD    Lab Results   Component Value Date/Time    Hemoglobin A1c 7.9 (H) 08/13/2021 08:07 AM    Hemoglobin A1c 7.6 (H) 02/11/2021 08:10 AM    Hemoglobin A1c 8.2 (H) 08/14/2020 11:39 AM    Glucose 141 (H) 08/13/2021 08:07 AM    Glucose  05/11/2018 09:12 AM    Microalbumin/Creat ratio (mg/g creat)  02/11/2021 08:10 AM     Cannot calculate ratio due to microalbumin result outside reportable range. Microalb/Creat ratio (ug/mg creat.) 3 08/13/2021 08:07 AM    Microalbumin,urine random <0.50 02/11/2021 08:10 AM    LDL, calculated 72 08/13/2021 08:07 AM    LDL, calculated 61.4 02/11/2021 08:10 AM    Creatinine 1.01 08/13/2021 08:07 AM      Lab Results   Component Value Date/Time    Cholesterol, total 141 08/13/2021 08:07 AM    HDL Cholesterol 58 08/13/2021 08:07 AM    LDL, calculated 72 08/13/2021 08:07 AM    LDL, calculated 61.4 02/11/2021 08:10 AM    Triglyceride 52 08/13/2021 08:07 AM    CHOL/HDL Ratio 2.2 02/11/2021 08:10 AM     Lab Results   Component Value Date/Time    ALT (SGPT) 16 08/13/2021 08:07 AM    Alk.  phosphatase 67 08/13/2021 08:07 AM    Bilirubin, total 0.5 08/13/2021 08:07 AM    Albumin 4.0 08/13/2021 08:07 AM    Protein, total 7.0 08/13/2021 08:07 AM     Lab Results   Component Value Date/Time    GFR est non-AA 79 08/13/2021 08:07 AM    GFR est AA 91 08/13/2021 08:07 AM    Creatinine 1.01 08/13/2021 08:07 AM    BUN 16 08/13/2021 08:07 AM    Sodium 139 08/13/2021 08:07 AM    Potassium 4.7 08/13/2021 08:07 AM    Chloride 104 08/13/2021 08:07 AM    CO2 23 08/13/2021 08:07 AM           ASSESSMENT and PLAN  1. Type 1 DM, un controlled :  DIVINA    a1c  Is   7.9 %   From august 2021   Compared  To  7.6 %  Feb 2021  Compared to  8.3 %   From august 2020  Compared to a1c is 7.3 %   From sept 2019  a1c is 7.9 %   From march 2019 ; a1c is 7.2 %     From sept 2018     comapred to  a1c  7.3 %  Today  May 2018 a1c is 9.6 %   From jan 2018  compared to A1c is  8.5 %     From sept 2017    Compared to    6.2 %     From may 2017   compared to  7.2 %      From jan 2017       Slight loss of    glycemic control,   DIET could be better , and he promises to do better   Continue on  lantus to 20 mg and humalog and metformin   He says he used to do so good on metformin plain , not ER , I stopped it because of him being DIVINA and that made his sugars go high    Discussed pump option, not very favorable ; So is with CGM  Patient is advised about checking blood sugars 4 times a day and maintaining log book. 2. Hypoglycemia :  Educated on treating the hypoglycemia. Discussed insulin use and gave glucagon      3. HTN : continue prinivil 5 mg  . 4. Dyslipidemia : continue lipitor     5. use of aspirin to prevent MI and TIA's discussed    6.  Neuropathy  : lot better since  neurontin USE, BUT HE DOES NOT USE IT REGULARLY  STAY ON   200 mg am and 400 mg hs          Reviewed results with patient and discussed the labs being ordered today/bnv  Patient voiced understanding of plan of care

## 2021-08-23 NOTE — PROGRESS NOTES
1. Have you been to the ER, urgent care clinic since your last visit? No  Hospitalized since your last visit? No    2. Have you seen or consulted any other health care providers outside of the [address] [place] [address] since your last visit? Include any pap smears or colon screening.  No    Wt Readings from Last 3 Encounters:   08/23/21 223 lb 9.6 oz (101.4 kg)   02/19/21 231 lb 6.4 oz (105 kg)   08/14/20 225 lb 14.4 oz (102.5 kg)     Temp Readings from Last 3 Encounters:   08/23/21 97.2 °F (36.2 °C) (Oral)   02/19/21 (!) 96.7 °F (35.9 °C) (Oral)   08/14/20 98.1 °F (36.7 °C) (Oral)     BP Readings from Last 3 Encounters:   08/23/21 (!) 161/80   02/19/21 (!) 172/86   08/14/20 142/76     Pulse Readings from Last 3 Encounters:   08/23/21 82   02/19/21 85   08/14/20 84     Lab Results   Component Value Date/Time    Hemoglobin A1c 7.9 (H) 08/13/2021 08:07 AM    Hemoglobin A1c (POC) 7.8 08/14/2020 10:20 AM

## 2021-08-23 NOTE — PATIENT INSTRUCTIONS
SPECIFIC INSTRUCTIONS BELOW       lipitor 40 mg at night     lisinopril to 5 mg a day        metformin  Plain 1000 mg one pill twice a day with meals ( he likes plain)    Check blood sugars immediately before each meal and at bedtime      continue   Lantus insulin  20  units  at bed time    Take  humalog 2-4  unit with b-fast  , 5 to 8 units with lunch  And 5 to 15  units with dinner  Based on carbs   To max dose of 50 units a day       Take additional humalog  as folllows with meals:    150-200 mg 1 units    201-250 mg 2 units    251-300 mg 3 units    301-350 mg 4 units    351-400 mg 5 units    401-450 mg 6 units    451-500 mg 7 units    Less than   70 -  No insulin            Do not skip meals  Do not eat in between meals    Reduce carbs- pasta, rice, potatoes, bread   Do not drink juices or sodas  Donot eat peanut butter     Do not eat sugar free cookies and cakes   Do not eat peaches, grapes , pine apples and oranges                 -------------PAY ATTENTION TO THESE GENERAL INSTRUCTIONS -----------------    -ANY tests other than blood work, which you opt to do  outside the  [facility] access hospital imaging facilities, you are responsible for prior authorizations if  required    - HEALTH MAINTENANCE IS NOT GOING TO BE UP TO DATE ON YOUR AVS- PLEASE IGNORE   - YOUR MED LIST IS NOT UP TO DATE AS SOME CHANGES ARE BEING MADE AFTER THE VISIT - FOLLOW SPECIFIC INSTRUCTIONS  ABOVE     Results     *Normal results will not be notified by a phone call starting January 1 2021   *If you have an upcoming visit, the results will be discussed at the visit   *Please sign up for MY CHART if you want access to your lab and test results  *Abnormal results which require immediate attention will be notified by phone call   *Abnormal results which do not require immediate assistance will be notified in 1-2 weeks       Refills    -    have your pharmacy send us a refill request . Refills are done max for one year and a visit is a must before refills are extended    Follow up appointments -  highly encourage you to make it when you are checking out. We can accommodate you into the schedule based on your clinical situation, but not for extending refills beyond a year. Labs are important to give refills and is important to get labs before the visit     Phone calls  -  Allow  24 hrs.  for non-urgent calls to be returned  Prior authorization - It may take 2-4 weeks to process  Forms  -  FMLA, DMV etc., will take up to 2 weeks to process  Cancellations - please notify the office 2 days in advance   Samples  - will only be dispensed at visits       If not showing for the appointments and cancelling appointments within 24 hours are kept track of and three  of such situations in  two consecutive years will likely be considered for termination from the practice    -------------------------------------------------------------------------------------------------------------------

## 2021-10-11 RX ORDER — PEN NEEDLE, DIABETIC 32GX 5/32"
NEEDLE, DISPOSABLE MISCELLANEOUS
Qty: 200 PEN NEEDLE | Refills: 2 | Status: SHIPPED | OUTPATIENT
Start: 2021-10-11 | End: 2022-02-25 | Stop reason: SDUPTHER

## 2022-01-27 DIAGNOSIS — I10 ESSENTIAL HYPERTENSION: ICD-10-CM

## 2022-01-27 DIAGNOSIS — E10.65 HYPERGLYCEMIA DUE TO TYPE 1 DIABETES MELLITUS (HCC): ICD-10-CM

## 2022-01-27 DIAGNOSIS — E78.2 MIXED HYPERLIPIDEMIA: ICD-10-CM

## 2022-01-27 DIAGNOSIS — Z79.4 ENCOUNTER FOR LONG-TERM (CURRENT) USE OF INSULIN (HCC): ICD-10-CM

## 2022-01-27 RX ORDER — INSULIN LISPRO 100 [IU]/ML
INJECTION, SOLUTION INTRAVENOUS; SUBCUTANEOUS
Qty: 15 ML | Refills: 2 | Status: SHIPPED | OUTPATIENT
Start: 2022-01-27 | End: 2022-02-25 | Stop reason: SDUPTHER

## 2022-01-28 DIAGNOSIS — E78.2 MIXED HYPERLIPIDEMIA: ICD-10-CM

## 2022-01-28 DIAGNOSIS — I10 ESSENTIAL HYPERTENSION: ICD-10-CM

## 2022-01-28 DIAGNOSIS — Z79.4 ENCOUNTER FOR LONG-TERM (CURRENT) USE OF INSULIN (HCC): ICD-10-CM

## 2022-01-28 DIAGNOSIS — E10.65 HYPERGLYCEMIA DUE TO TYPE 1 DIABETES MELLITUS (HCC): ICD-10-CM

## 2022-01-28 RX ORDER — METFORMIN HYDROCHLORIDE 1000 MG/1
TABLET ORAL
Qty: 60 TABLET | Refills: 6 | Status: SHIPPED | OUTPATIENT
Start: 2022-01-28 | End: 2022-07-29 | Stop reason: SDUPTHER

## 2022-02-25 ENCOUNTER — OFFICE VISIT (OUTPATIENT)
Dept: ENDOCRINOLOGY | Age: 67
End: 2022-02-25
Payer: COMMERCIAL

## 2022-02-25 VITALS
HEIGHT: 72 IN | OXYGEN SATURATION: 98 % | WEIGHT: 238 LBS | DIASTOLIC BLOOD PRESSURE: 89 MMHG | TEMPERATURE: 98 F | HEART RATE: 82 BPM | BODY MASS INDEX: 32.23 KG/M2 | SYSTOLIC BLOOD PRESSURE: 172 MMHG

## 2022-02-25 DIAGNOSIS — Z79.4 ENCOUNTER FOR LONG-TERM (CURRENT) USE OF INSULIN (HCC): ICD-10-CM

## 2022-02-25 DIAGNOSIS — E78.2 MIXED HYPERLIPIDEMIA: ICD-10-CM

## 2022-02-25 DIAGNOSIS — I10 ESSENTIAL HYPERTENSION: ICD-10-CM

## 2022-02-25 DIAGNOSIS — E10.65 HYPERGLYCEMIA DUE TO TYPE 1 DIABETES MELLITUS (HCC): ICD-10-CM

## 2022-02-25 PROCEDURE — 99214 OFFICE O/P EST MOD 30 MIN: CPT | Performed by: INTERNAL MEDICINE

## 2022-02-25 PROCEDURE — 3052F HG A1C>EQUAL 8.0%<EQUAL 9.0%: CPT | Performed by: INTERNAL MEDICINE

## 2022-02-25 RX ORDER — PEN NEEDLE, DIABETIC 31 GX3/16"
NEEDLE, DISPOSABLE MISCELLANEOUS
Qty: 200 PEN NEEDLE | Refills: 6 | Status: SHIPPED | OUTPATIENT
Start: 2022-02-25 | End: 2022-07-29 | Stop reason: SDUPTHER

## 2022-02-25 RX ORDER — LISINOPRIL 5 MG/1
TABLET ORAL
Qty: 90 TABLET | Refills: 3 | Status: SHIPPED | OUTPATIENT
Start: 2022-02-25 | End: 2022-06-15

## 2022-02-25 RX ORDER — INSULIN LISPRO 100 [IU]/ML
INJECTION, SOLUTION INTRAVENOUS; SUBCUTANEOUS
Qty: 15 ML | Refills: 6 | Status: SHIPPED | OUTPATIENT
Start: 2022-02-25 | End: 2022-04-28

## 2022-02-25 RX ORDER — INSULIN GLARGINE 100 [IU]/ML
INJECTION, SOLUTION SUBCUTANEOUS
Qty: 15 ML | Refills: 6 | Status: SHIPPED | OUTPATIENT
Start: 2022-02-25 | End: 2022-07-29 | Stop reason: SDUPTHER

## 2022-02-25 RX ORDER — ATORVASTATIN CALCIUM 40 MG/1
TABLET, FILM COATED ORAL
Qty: 30 TABLET | Refills: 6 | Status: SHIPPED | OUTPATIENT
Start: 2022-02-25 | End: 2022-07-29 | Stop reason: SDUPTHER

## 2022-02-25 NOTE — PATIENT INSTRUCTIONS
SPECIFIC INSTRUCTIONS BELOW       lipitor 40 mg at night     lisinopril to 5 mg a day        metformin  Plain 1000 mg one pill twice a day with meals ( he likes plain)    Check blood sugars immediately before each meal and at bedtime      continue   Lantus insulin  20  units  at bed time    Take  humalog 2-4  unit with b-fast  , 5 to 8 units with lunch  And 5 to 15  units with dinner  Based on carbs   To max dose of 50 units a day       Take additional humalog  as folllows with meals:    150-200 mg 1 units    201-250 mg 2 units    251-300 mg 3 units    301-350 mg 4 units    351-400 mg 5 units    401-450 mg 6 units    451-500 mg 7 units    Less than   70 -  No insulin            Do not skip meals  Do not eat in between meals    Reduce carbs- pasta, rice, potatoes, bread   Do not drink juices or sodas  Donot eat peanut butter     Do not eat sugar free cookies and cakes   Do not eat peaches, grapes , pine apples and oranges           -------------PAY ATTENTION TO THESE GENERAL INSTRUCTIONS -----------------      - The medications prescribed at this visit will not be available at pharmacy until 6 pm       - YOUR MED LIST IS NOT UP TO DATE AS SOME CHANGES ARE BEING MADE AFTER THE VISIT - FOLLOW SPECIFIC INSTRUCTIONS  ABOVE     -ANY tests other than blood work, which you opt to do  outside the  [facility] imaging facilities, you are responsible for prior authorizations if  required    - [address] UP TO DATE ON YOUR AVS- PLEASE IGNORE     Results     *Normal results will not be notified by a phone call starting January 1 2021   *If you have an upcoming visit, the results will be discussed at the visit   *Please sign up for MY CHART if you want access to your lab and test results  *Abnormal results which require immediate attention will be notified by phone call   *Abnormal results which do not require immediate assistance will be notified in 1-2 weeks       Refills    -    have your pharmacy send us a refill request . Refills are done max for one year and a visit is a must before refills are extended    Follow up appointments -  highly encourage you to make it when you are checking out. We can accommodate you into the schedule based on your clinical situation, but not for extending refills beyond a year. Labs are important to give refills and is important to get labs before the visit     Phone calls  -  Allow  24 hrs.  for non-urgent calls to be returned  Prior authorization - It may take 2-4 weeks to process  Forms  -  FMLA, DMV etc., will take up to 2 weeks to process  Cancellations - please notify the office 2 days in advance   Samples  - will only be dispensed at visits       If not showing for the appointments and cancelling appointments within 24 hours are kept track of and three  of such situations in  two consecutive years will likely be considered for termination from the practice    -------------------------------------------------------------------------------------------------------------------

## 2022-02-25 NOTE — PROGRESS NOTES
Patricia Sarah is a 61 y.o. male here for   Chief Complaint   Patient presents with    Diabetes       1. Have you been to the ER, urgent care clinic since your last visit? Hospitalized since your last visit? - no    2. Have you seen or consulted any other health care providers outside of the [address] [place] [address] since your last visit?   Include any pap smears or colon screening.-  no

## 2022-02-25 NOTE — LETTER
2/25/2022    Patient: Chica Fuentes   YOB: 1955   Date of Visit: 2/25/2022     [facility] [place] [id number]  Via Fax: [phone]    Dear Karson Mcbride DO,      Thank you for referring Mr. Tyrel Mejia to [address] [place] [address] for evaluation. My notes for this consultation are attached. If you have questions, please do not hesitate to call me. I look forward to following your patient along with you.       Sincerely,    Jose Christianson MD

## 2022-02-25 NOTE — PROGRESS NOTES
HISTORY OF PRESENT ILLNESS  Bevely Lennox is a 61 y.o. male. HPI  Patient here for f/u after last visit of Type 1 diabetes mellitus   From  August 2021     Gained 15 lbs   Hunting season  , so not paying attention to diet       August 2021     Checks intermittently       Feb 201     Pt is sincere, tries his best to keep sugars in range         Old history :    Referred : by pcp  H/o diabetes for 3  years  Current A1C is over 9%   and symptoms/problems include polyuria, polydipsia and visual disturbances   Current diabetic medications include metformin, prandin  4 mg TID  , lantus 30 units hs . Review of Systems   Constitutional: Negative. Psychiatric/Behavioral: Negative for depression and memory loss. The patient does not have insomnia. Phy exam   NAD    Lab Results   Component Value Date/Time    Hemoglobin A1c 8.3 (H) 02/18/2022 08:26 AM    Hemoglobin A1c 7.9 (H) 08/13/2021 08:07 AM    Hemoglobin A1c 7.6 (H) 02/11/2021 08:10 AM    Glucose 199 (H) 02/18/2022 08:26 AM    Glucose  05/11/2018 09:12 AM    Microalbumin/Creat ratio (mg/g creat) 7 02/18/2022 08:26 AM    Microalbumin,urine random 1.28 02/18/2022 08:26 AM    LDL, calculated 68 02/18/2022 08:26 AM    Creatinine 1.19 02/18/2022 08:26 AM      Lab Results   Component Value Date/Time    Cholesterol, total 144 02/18/2022 08:26 AM    HDL Cholesterol 66 02/18/2022 08:26 AM    LDL, calculated 68 02/18/2022 08:26 AM    Triglyceride 50 02/18/2022 08:26 AM    CHOL/HDL Ratio 2.2 02/18/2022 08:26 AM     Lab Results   Component Value Date/Time    ALT (SGPT) 25 02/18/2022 08:26 AM    Alk.  phosphatase 78 02/18/2022 08:26 AM    Bilirubin, total 0.6 02/18/2022 08:26 AM    Albumin 3.7 02/18/2022 08:26 AM    Protein, total 7.3 02/18/2022 08:26 AM     Lab Results   Component Value Date/Time    GFR est non-AA >60 02/18/2022 08:26 AM    GFR est AA >60 02/18/2022 08:26 AM    Creatinine 1.19 02/18/2022 08:26 AM    BUN 14 02/18/2022 08:26 AM    Sodium 135 (L) 02/18/2022 08:26 AM    Potassium 4.6 02/18/2022 08:26 AM    Chloride 104 02/18/2022 08:26 AM    CO2 26 02/18/2022 08:26 AM           ASSESSMENT and PLAN  1. Type 1 DM, un controlled :  DIVINA    a1c  Is   8.3 %  From  Feb 2022  Compared to  7.9 %   From august 2021   Compared  To  7.6 %  Feb 2021  Compared to  8.3 %   From august 2020  Compared to a1c is 7.3 %   From sept 2019  a1c is 7.9 %   From march 2019 ; a1c is 7.2 %     From sept 2018     comapred to  a1c  7.3 %  Today  May 2018 a1c is 9.6 %   From jan 2018  compared to A1c is  8.5 %     From sept 2017    Compared to    6.2 %     From may 2017   compared to  7.2 %      From jan 2017        loss of    glycemic control,   DIET could be better , and he promises to do better ( after hunting season)  Continue on  lantus to 20 units  and humalog and metformin   He likes to stay on  metformin plain , not ER ( I stopped it because of him being DIVINA and that made his sugars go high)   I reiterated today that it is not metformin , that it is food     Discussed pump option, not very favorable ; So is with CGM  Patient is advised about checking blood sugars 4 times a day and maintaining log book. 2. Hypoglycemia :  Educated on treating the hypoglycemia. Discussed insulin use and gave glucagon      3. HTN :  Uncontrolled,  Increased  prinivil 5  mg  ,, by mistake he is taking 2.5 mg a  Day      4. Dyslipidemia : continue lipitor     5. use of aspirin to prevent MI and TIA's discussed    6. Neuropathy  : not using neurontin       7.  Due for eye visit for retinopathy         Reviewed results with patient and discussed the labs being ordered today/bnv  Patient voiced understanding of plan of care

## 2022-03-19 PROBLEM — E10.65 HYPERGLYCEMIA DUE TO TYPE 1 DIABETES MELLITUS (HCC): Status: ACTIVE | Noted: 2017-01-15

## 2022-03-19 PROBLEM — E11.40 TYPE 2 DIABETES MELLITUS WITH DIABETIC NEUROPATHY (HCC): Status: ACTIVE | Noted: 2020-08-14

## 2022-04-27 DIAGNOSIS — Z79.4 ENCOUNTER FOR LONG-TERM (CURRENT) USE OF INSULIN (HCC): ICD-10-CM

## 2022-04-27 DIAGNOSIS — E78.2 MIXED HYPERLIPIDEMIA: ICD-10-CM

## 2022-04-27 DIAGNOSIS — I10 ESSENTIAL HYPERTENSION: ICD-10-CM

## 2022-04-27 DIAGNOSIS — E10.65 HYPERGLYCEMIA DUE TO TYPE 1 DIABETES MELLITUS (HCC): ICD-10-CM

## 2022-04-28 RX ORDER — INSULIN LISPRO 100 [IU]/ML
INJECTION, SOLUTION INTRAVENOUS; SUBCUTANEOUS
Qty: 15 ML | Refills: 6 | Status: SHIPPED | OUTPATIENT
Start: 2022-04-28 | End: 2022-07-29 | Stop reason: SDUPTHER

## 2022-07-23 LAB
ALBUMIN SERPL-MCNC: 3.9 G/DL (ref 3.8–4.8)
ALBUMIN/CREAT UR: 3 MG/G CREAT (ref 0–29)
ALBUMIN/GLOB SERPL: 1.3 {RATIO} (ref 1.2–2.2)
ALP SERPL-CCNC: 64 IU/L (ref 44–121)
ALT SERPL-CCNC: 11 IU/L (ref 0–44)
AST SERPL-CCNC: 17 IU/L (ref 0–40)
BILIRUB SERPL-MCNC: 0.5 MG/DL (ref 0–1.2)
BUN SERPL-MCNC: 16 MG/DL (ref 8–27)
BUN/CREAT SERPL: 14 (ref 10–24)
CALCIUM SERPL-MCNC: 9 MG/DL (ref 8.6–10.2)
CHLORIDE SERPL-SCNC: 102 MMOL/L (ref 96–106)
CHOLEST SERPL-MCNC: 138 MG/DL (ref 100–199)
CO2 SERPL-SCNC: 23 MMOL/L (ref 20–29)
CREAT SERPL-MCNC: 1.15 MG/DL (ref 0.76–1.27)
CREAT UR-MCNC: 168.2 MG/DL
EGFR: 71 ML/MIN/1.73
EST. AVERAGE GLUCOSE BLD GHB EST-MCNC: 163 MG/DL
GLOBULIN SER CALC-MCNC: 2.9 G/DL (ref 1.5–4.5)
GLUCOSE SERPL-MCNC: 107 MG/DL (ref 65–99)
HBA1C MFR BLD: 7.3 % (ref 4.8–5.6)
HDLC SERPL-MCNC: 56 MG/DL
IMP & REVIEW OF LAB RESULTS: NORMAL
LDLC SERPL CALC-MCNC: 71 MG/DL (ref 0–99)
MICROALBUMIN UR-MCNC: 5.8 UG/ML
POTASSIUM SERPL-SCNC: 4.7 MMOL/L (ref 3.5–5.2)
PROT SERPL-MCNC: 6.8 G/DL (ref 6–8.5)
SODIUM SERPL-SCNC: 138 MMOL/L (ref 134–144)
TRIGL SERPL-MCNC: 49 MG/DL (ref 0–149)
VLDLC SERPL CALC-MCNC: 11 MG/DL (ref 5–40)

## 2022-07-29 ENCOUNTER — OFFICE VISIT (OUTPATIENT)
Dept: ENDOCRINOLOGY | Age: 67
End: 2022-07-29
Payer: COMMERCIAL

## 2022-07-29 VITALS
RESPIRATION RATE: 20 BRPM | TEMPERATURE: 97.6 F | WEIGHT: 224.4 LBS | HEART RATE: 78 BPM | OXYGEN SATURATION: 99 % | HEIGHT: 72 IN | DIASTOLIC BLOOD PRESSURE: 83 MMHG | SYSTOLIC BLOOD PRESSURE: 158 MMHG | BODY MASS INDEX: 30.4 KG/M2

## 2022-07-29 DIAGNOSIS — Z79.4 ENCOUNTER FOR LONG-TERM (CURRENT) USE OF INSULIN (HCC): ICD-10-CM

## 2022-07-29 DIAGNOSIS — E10.65 HYPERGLYCEMIA DUE TO TYPE 1 DIABETES MELLITUS (HCC): Primary | ICD-10-CM

## 2022-07-29 DIAGNOSIS — I10 ESSENTIAL HYPERTENSION: ICD-10-CM

## 2022-07-29 DIAGNOSIS — E78.2 MIXED HYPERLIPIDEMIA: ICD-10-CM

## 2022-07-29 PROCEDURE — 99214 OFFICE O/P EST MOD 30 MIN: CPT | Performed by: INTERNAL MEDICINE

## 2022-07-29 PROCEDURE — 3051F HG A1C>EQUAL 7.0%<8.0%: CPT | Performed by: INTERNAL MEDICINE

## 2022-07-29 RX ORDER — PEN NEEDLE, DIABETIC 31 GX3/16"
NEEDLE, DISPOSABLE MISCELLANEOUS
Qty: 500 PEN NEEDLE | Refills: 3 | Status: SHIPPED | OUTPATIENT
Start: 2022-07-29 | End: 2022-08-01 | Stop reason: SDUPTHER

## 2022-07-29 RX ORDER — ATORVASTATIN CALCIUM 40 MG/1
TABLET, FILM COATED ORAL
Qty: 90 TABLET | Refills: 3 | Status: SHIPPED | OUTPATIENT
Start: 2022-07-29 | End: 2022-08-01 | Stop reason: SDUPTHER

## 2022-07-29 RX ORDER — INSULIN LISPRO 100 [IU]/ML
INJECTION, SOLUTION INTRAVENOUS; SUBCUTANEOUS
Qty: 45 ML | Refills: 3 | Status: SHIPPED | OUTPATIENT
Start: 2022-07-29 | End: 2022-08-01 | Stop reason: SDUPTHER

## 2022-07-29 RX ORDER — METFORMIN HYDROCHLORIDE 1000 MG/1
TABLET ORAL
Qty: 90 TABLET | Refills: 3 | Status: SHIPPED | OUTPATIENT
Start: 2022-07-29 | End: 2022-08-01 | Stop reason: SDUPTHER

## 2022-07-29 RX ORDER — INSULIN GLARGINE 100 [IU]/ML
INJECTION, SOLUTION SUBCUTANEOUS
Qty: 45 ML | Refills: 3 | Status: SHIPPED | OUTPATIENT
Start: 2022-07-29 | End: 2022-08-01 | Stop reason: SDUPTHER

## 2022-07-29 NOTE — PROGRESS NOTES
1. Have you been to the ER, urgent care clinic since your last visit? No Hospitalized since your last visit? No    2. Have you seen or consulted any other health care providers outside of the [address] [place] [address] since your last visit? Include any pap smears or colon screening.  No    Wt Readings from Last 3 Encounters:   07/29/22 224 lb 6.4 oz (101.8 kg)   02/25/22 238 lb (108 kg)   08/23/21 223 lb 9.6 oz (101.4 kg)     Temp Readings from Last 3 Encounters:   07/29/22 97.6 °F (36.4 °C) (Temporal)   02/25/22 98 °F (36.7 °C) (Temporal)   08/23/21 97.2 °F (36.2 °C) (Oral)     BP Readings from Last 3 Encounters:   07/29/22 (!) 158/83   02/25/22 (!) 172/89   08/23/21 (!) 161/80     Pulse Readings from Last 3 Encounters:   07/29/22 78   02/25/22 82   08/23/21 82     Lab Results   Component Value Date/Time    Hemoglobin A1c 7.3 (H) 07/22/2022 12:00 AM    Hemoglobin A1c (POC) 7.8 08/14/2020 10:20 AM

## 2022-07-29 NOTE — LETTER
7/29/2022    Patient: Romy Miner   YOB: 1955   Date of Visit: 7/29/2022     [address] [address]  Via Fax: [phone]    Dear Stan Montgomery DO,      Thank you for referring Mr. Kayden Hidalgo to [address] [place] [address] for evaluation. My notes for this consultation are attached. If you have questions, please do not hesitate to call me. I look forward to following your patient along with you.       Sincerely,    Rober Cardenas MD

## 2022-07-29 NOTE — PROGRESS NOTES
HISTORY OF PRESENT ILLNESS  Radha Wilson is a 61 y.o. male. HPI  Patient here for f/u after last visit of Type 1 diabetes mellitus   From  feb 2022     LOST 14 lbs ( he says I told him to )        Feb 2022     Gained 15 lbs   Hunting season  , so not paying attention to diet         Old history :    Referred : by pcp  H/o diabetes for 3  years  Current A1C is over 9%   and symptoms/problems include polyuria, polydipsia and visual disturbances   Current diabetic medications include metformin, prandin  4 mg TID  , lantus 30 units hs . Review of Systems   Constitutional: Negative. Psychiatric/Behavioral: Negative for depression and memory loss. The patient does not have insomnia. Phy exam   NAD    Lab Results   Component Value Date/Time    Hemoglobin A1c 7.3 (H) 07/22/2022 12:00 AM    Hemoglobin A1c 8.3 (H) 02/18/2022 08:26 AM    Hemoglobin A1c 7.9 (H) 08/13/2021 08:07 AM    Glucose 107 (H) 07/22/2022 12:00 AM    Glucose  05/11/2018 09:12 AM    Microalbumin/Creat ratio (mg/g creat) 7 02/18/2022 08:26 AM    Microalb/Creat ratio (ug/mg creat.) 3 07/22/2022 12:00 AM    Microalbumin,urine random 1.28 02/18/2022 08:26 AM    LDL, calculated 71 07/22/2022 12:00 AM    LDL, calculated 68 02/18/2022 08:26 AM    Creatinine 1.15 07/22/2022 12:00 AM      Lab Results   Component Value Date/Time    Cholesterol, total 138 07/22/2022 12:00 AM    HDL Cholesterol 56 07/22/2022 12:00 AM    LDL, calculated 71 07/22/2022 12:00 AM    LDL, calculated 68 02/18/2022 08:26 AM    Triglyceride 49 07/22/2022 12:00 AM    CHOL/HDL Ratio 2.2 02/18/2022 08:26 AM     Lab Results   Component Value Date/Time    ALT (SGPT) 11 07/22/2022 12:00 AM    Alk.  phosphatase 64 07/22/2022 12:00 AM    Bilirubin, total 0.5 07/22/2022 12:00 AM    Albumin 3.9 07/22/2022 12:00 AM    Protein, total 6.8 07/22/2022 12:00 AM     Lab Results   Component Value Date/Time    GFR est non-AA >60 02/18/2022 08:26 AM    GFR est AA >60 02/18/2022 08:26 AM Creatinine 1.15 07/22/2022 12:00 AM    BUN 16 07/22/2022 12:00 AM    Sodium 138 07/22/2022 12:00 AM    Potassium 4.7 07/22/2022 12:00 AM    Chloride 102 07/22/2022 12:00 AM    CO2 23 07/22/2022 12:00 AM     ASSESSMENT and PLAN  1. Type 1 DM, un controlled :  DIVINA    a1c  Is   7.3 %   from    July 2022   compared to   8.3 %  From  Feb 2022  Compared to  7.9 %   From august 2021   Compared  To  7.6 %  Feb 2021  Compared to  8.3 %   From august 2020  Compared to a1c is 7.3 %   From sept 2019  a1c is 7.9 %   From march 2019 ; a1c is 7.2 %     From sept 2018     comapred to  a1c  7.3 %  Today  May 2018 a1c is 9.6 %   From jan 2018  compared to A1c is  8.5 %          July 2022    Improved   glycemic control,   DIET got  better , and he kept up his promise   Continue on  lantus to 20 units  and humalog and metformin   He likes to stay on  metformin plain , not ER ( I stopped it because of him being DIVINA and that made his sugars go high)     Discussed pump option, not very favorable ; So is with CGM        Jan 2022      loss of    glycemic control,   DIET could be better , and he promises to do better ( after hunting season)  Continue on  lantus to 20 units  and humalog and metformin   He likes to stay on  metformin plain , not ER ( I stopped it because of him being DIVINA and that made his sugars go high)   I reiterated today that it is not metformin , that it is food     Discussed pump option, not very favorable ; So is with CGM  Patient is advised about checking blood sugars 4 times a day and maintaining log book. 2. Hypoglycemia :  Educated on treating the hypoglycemia. Discussed insulin use and gave glucagon      3. HTN :  Uncontrolled,  Increased  prinivil 5  mg  ,, by mistake he is taking 2.5 mg a  Day      4. Dyslipidemia : continue lipitor     5. use of aspirin to prevent MI and TIA's discussed    6.  Neuropathy  : not using neurontin         Reviewed results with patient and discussed the labs being ordered today/bnv  Patient voiced understanding of plan of care

## 2022-07-29 NOTE — PATIENT INSTRUCTIONS
SPECIFIC INSTRUCTIONS BELOW       lipitor 40 mg at night     lisinopril to 5 mg a day        metformin  Plain 1000 mg one pill twice a day with meals ( he likes plain)    Check blood sugars immediately before each meal and at bedtime      continue   Lantus insulin  20  units  at bed time    Take  humalog 2-4  unit with b-fast  , 5 to 8 units with lunch  And 5 to 15  units with dinner  Based on carbs   To max dose of 50 units a day       Take additional humalog  as folllows with meals:    150-200 mg 1 units    201-250 mg 2 units    251-300 mg 3 units    301-350 mg 4 units    351-400 mg 5 units    401-450 mg 6 units    451-500 mg 7 units    Less than   70 -  No insulin          -------------PAY ATTENTION TO THESE GENERAL INSTRUCTIONS -----------------      - The medications prescribed at this visit will not be available at pharmacy until 6 pm       - YOUR MED LIST IS NOT UP TO DATE AS SOME CHANGES ARE BEING MADE AFTER THE VISIT - FOLLOW SPECIFIC INSTRUCTIONS  ABOVE     -ANY tests other than blood work, which you opt to do  outside the  [facility] imaging facilities, you are responsible for prior authorizations if  required    - [address] UP TO DATE ON YOUR AVS- PLEASE IGNORE     Results     *Normal results will not be notified by a phone call starting January 1 2021   *If you have an upcoming visit, the results will be discussed at the visit   *Please sign up for MY CHART if you want access to your lab and test results  *Abnormal results which require immediate attention will be notified by phone call   *Abnormal results which do not require immediate assistance will be notified in 1-2 weeks       Refills    -    have your pharmacy send us a refill request . Refills are done max for one year and a visit is a must before refills are extended    Follow up appointments -  highly encourage you to make it when you are checking out.  We can accommodate you into the schedule based on your clinical situation, but not for extending refills beyond a year. Labs are important to give refills and is important to get labs before the visit     Phone calls  -  Allow  24 hrs.  for non-urgent calls to be returned  Prior authorization - It may take 2-4 weeks to process  Forms  -  FMLA, DMV etc., will take up to 2 weeks to process  Cancellations - please notify the office 2 days in advance   Samples  - will only be dispensed at visits       If not showing for the appointments and cancelling appointments within 24 hours are kept track of and three  of such situations in  two consecutive years will likely be considered for termination from the practice    -------------------------------------------------------------------------------------------------------------------

## 2022-08-01 ENCOUNTER — TELEPHONE (OUTPATIENT)
Dept: ENDOCRINOLOGY | Age: 67
End: 2022-08-01

## 2022-08-01 DIAGNOSIS — Z79.4 ENCOUNTER FOR LONG-TERM (CURRENT) USE OF INSULIN (HCC): ICD-10-CM

## 2022-08-01 DIAGNOSIS — E10.65 HYPERGLYCEMIA DUE TO TYPE 1 DIABETES MELLITUS (HCC): ICD-10-CM

## 2022-08-01 DIAGNOSIS — E78.2 MIXED HYPERLIPIDEMIA: ICD-10-CM

## 2022-08-01 DIAGNOSIS — I10 ESSENTIAL HYPERTENSION: ICD-10-CM

## 2022-08-01 RX ORDER — METFORMIN HYDROCHLORIDE 1000 MG/1
TABLET ORAL
Qty: 60 TABLET | Refills: 5 | Status: SHIPPED | OUTPATIENT
Start: 2022-08-01

## 2022-08-01 RX ORDER — BLOOD SUGAR DIAGNOSTIC
STRIP MISCELLANEOUS
Qty: 200 STRIP | Refills: 6 | Status: SHIPPED | OUTPATIENT
Start: 2022-08-01

## 2022-08-01 RX ORDER — ATORVASTATIN CALCIUM 40 MG/1
TABLET, FILM COATED ORAL
Qty: 30 TABLET | Refills: 5 | Status: SHIPPED | OUTPATIENT
Start: 2022-08-01

## 2022-08-01 RX ORDER — LISINOPRIL 5 MG/1
TABLET ORAL
Qty: 30 TABLET | Refills: 5 | Status: SHIPPED | OUTPATIENT
Start: 2022-08-01

## 2022-08-01 RX ORDER — PEN NEEDLE, DIABETIC 31 GX3/16"
NEEDLE, DISPOSABLE MISCELLANEOUS
Qty: 150 PEN NEEDLE | Refills: 5 | Status: SHIPPED | OUTPATIENT
Start: 2022-08-01

## 2022-08-01 RX ORDER — INSULIN GLARGINE 100 [IU]/ML
INJECTION, SOLUTION SUBCUTANEOUS
Qty: 15 ML | Refills: 5 | Status: SHIPPED | OUTPATIENT
Start: 2022-08-01

## 2022-08-01 RX ORDER — INSULIN LISPRO 100 [IU]/ML
INJECTION, SOLUTION INTRAVENOUS; SUBCUTANEOUS
Qty: 15 ML | Refills: 5 | Status: SHIPPED | OUTPATIENT
Start: 2022-08-01

## 2022-08-01 NOTE — TELEPHONE ENCOUNTER
Patient would like to speak about switching his med back because he is paying a lot more for 90 verses 30 day supply.

## 2022-08-01 NOTE — TELEPHONE ENCOUNTER
Returned call to patient. He requests that all of his prescriptions be sent in for 30 days instead of 90 days.

## 2022-08-01 NOTE — TELEPHONE ENCOUNTER
Patient states he wants all of his prescriptions redone to be exactly the way he had them before as they are too costly now.  He states Dr. Darylene Arabia would know how they need to be written

## 2022-08-01 NOTE — TELEPHONE ENCOUNTER
Can you call his pharmacy -     I did 90 days script on him instead of month refills     My impression was that if we do 3 months scripts also, that pharmacy can do monthly refills if patients cannot afford them     Matthew Johnson MD

## 2022-08-08 ENCOUNTER — TRANSCRIBE ORDER (OUTPATIENT)
Dept: SCHEDULING | Age: 67
End: 2022-08-08

## 2022-08-08 DIAGNOSIS — N28.1 ACQUIRED CYST OF KIDNEY: Primary | ICD-10-CM

## 2022-08-11 ENCOUNTER — HOSPITAL ENCOUNTER (OUTPATIENT)
Dept: ULTRASOUND IMAGING | Age: 67
Discharge: HOME | End: 2022-08-11
Payer: COMMERCIAL

## 2022-08-11 DIAGNOSIS — N28.1 ACQUIRED CYST OF KIDNEY: ICD-10-CM

## 2022-08-11 PROCEDURE — 76770 US EXAM ABDO BACK WALL COMP: CPT

## 2022-11-18 DIAGNOSIS — Z79.4 ENCOUNTER FOR LONG-TERM (CURRENT) USE OF INSULIN (HCC): ICD-10-CM

## 2022-11-18 DIAGNOSIS — E78.2 MIXED HYPERLIPIDEMIA: ICD-10-CM

## 2022-11-18 DIAGNOSIS — I10 ESSENTIAL HYPERTENSION: ICD-10-CM

## 2022-11-18 DIAGNOSIS — E10.65 HYPERGLYCEMIA DUE TO TYPE 1 DIABETES MELLITUS (HCC): ICD-10-CM

## 2022-11-18 RX ORDER — INSULIN LISPRO 100 [IU]/ML
INJECTION, SOLUTION INTRAVENOUS; SUBCUTANEOUS
Qty: 15 ML | Refills: 5 | Status: SHIPPED | OUTPATIENT
Start: 2022-11-18

## 2023-02-03 ENCOUNTER — OFFICE VISIT (OUTPATIENT)
Dept: ENDOCRINOLOGY | Age: 68
End: 2023-02-03
Payer: COMMERCIAL

## 2023-02-03 VITALS
SYSTOLIC BLOOD PRESSURE: 136 MMHG | HEART RATE: 87 BPM | WEIGHT: 222.13 LBS | TEMPERATURE: 97 F | HEIGHT: 72 IN | BODY MASS INDEX: 30.09 KG/M2 | OXYGEN SATURATION: 100 % | DIASTOLIC BLOOD PRESSURE: 72 MMHG

## 2023-02-03 DIAGNOSIS — I10 ESSENTIAL HYPERTENSION: ICD-10-CM

## 2023-02-03 DIAGNOSIS — E10.65 HYPERGLYCEMIA DUE TO TYPE 1 DIABETES MELLITUS (HCC): Primary | ICD-10-CM

## 2023-02-03 DIAGNOSIS — E78.2 MIXED HYPERLIPIDEMIA: ICD-10-CM

## 2023-02-03 DIAGNOSIS — G62.9 NEUROPATHY: ICD-10-CM

## 2023-02-03 DIAGNOSIS — E10.65 HYPERGLYCEMIA DUE TO TYPE 1 DIABETES MELLITUS (HCC): ICD-10-CM

## 2023-02-03 DIAGNOSIS — Z79.4 ENCOUNTER FOR LONG-TERM (CURRENT) USE OF INSULIN (HCC): ICD-10-CM

## 2023-02-03 RX ORDER — ATORVASTATIN CALCIUM 40 MG/1
TABLET, FILM COATED ORAL
Qty: 90 TABLET | Refills: 3 | Status: SHIPPED | OUTPATIENT
Start: 2023-02-03

## 2023-02-03 RX ORDER — INSULIN GLARGINE 100 [IU]/ML
INJECTION, SOLUTION SUBCUTANEOUS
Qty: 15 ML | Refills: 5 | Status: SHIPPED | OUTPATIENT
Start: 2023-02-03

## 2023-02-03 RX ORDER — AMLODIPINE BESYLATE 5 MG/1
5 TABLET ORAL DAILY
COMMUNITY
Start: 2023-01-20

## 2023-02-03 RX ORDER — BLOOD SUGAR DIAGNOSTIC
STRIP MISCELLANEOUS
Qty: 200 STRIP | Refills: 6 | Status: SHIPPED | OUTPATIENT
Start: 2023-02-03

## 2023-02-03 RX ORDER — INSULIN LISPRO 100 [IU]/ML
INJECTION, SOLUTION INTRAVENOUS; SUBCUTANEOUS
Qty: 15 ML | Refills: 5 | Status: SHIPPED | OUTPATIENT
Start: 2023-02-03

## 2023-02-03 RX ORDER — LISINOPRIL 5 MG/1
TABLET ORAL
Qty: 90 TABLET | Refills: 3 | Status: SHIPPED | OUTPATIENT
Start: 2023-02-03

## 2023-02-03 RX ORDER — METFORMIN HYDROCHLORIDE 1000 MG/1
TABLET ORAL
Qty: 180 TABLET | Refills: 3 | Status: SHIPPED | OUTPATIENT
Start: 2023-02-03

## 2023-02-03 RX ORDER — PEN NEEDLE, DIABETIC 31 GX3/16"
NEEDLE, DISPOSABLE MISCELLANEOUS
Qty: 150 PEN NEEDLE | Refills: 5 | Status: SHIPPED | OUTPATIENT
Start: 2023-02-03

## 2023-02-03 RX ORDER — LANCETS
EACH MISCELLANEOUS
Qty: 200 EACH | Refills: 6 | Status: SHIPPED | OUTPATIENT
Start: 2023-02-03

## 2023-02-03 NOTE — PATIENT INSTRUCTIONS
SPECIFIC INSTRUCTIONS BELOW     90 day - ( metformin , lipitor and lisinopril ) - ashley     lipitor 40 mg at night     lisinopril to 5 mg a day      metformin  Plain 1000 mg one pill twice a day with meals ( he likes plain)          30 day for insulin, pen needles   and testing supplies   ashley     Check blood sugars immediately before each meal and at bedtime      continue   Lantus insulin  20  units  at bed time    Take  humalog 2-4  unit with b-fast  , 5 to 8 units with lunch  And 5 to 15  units with dinner  Based on carbs   To max dose of 50 units a day       Take additional humalog  as folllows with meals:    150-200 mg 1 units    201-250 mg 2 units    251-300 mg 3 units    301-350 mg 4 units    351-400 mg 5 units    401-450 mg 6 units    451-500 mg 7 units    Less than   70 -  No insulin          -------------PAY ATTENTION TO THESE GENERAL INSTRUCTIONS -----------------      - The medications prescribed at this visit will not be available at pharmacy until 6 pm       - YOUR MED LIST IS NOT UP TO DATE AS SOME CHANGES ARE BEING MADE AFTER THE VISIT - FOLLOW SPECIFIC INSTRUCTIONS  ABOVE     -ANY tests other than blood work, which you opt to do  outside the  [facility] imaging facilities, you are responsible for prior authorizations if  required    - [address] UP TO DATE ON YOUR AVS- PLEASE IGNORE     Results     *Normal results will not be notified by a phone call starting January 1 2021   *If you have an upcoming visit, the results will be discussed at the visit   *Please sign up for MY CHART if you want access to your lab and test results  *Abnormal results which require immediate attention will be notified by phone call   *Abnormal results which do not require immediate assistance will be notified in 1-2 weeks       Refills    -    have your pharmacy send us a refill request . Refills are done max for one year and a visit is a must before refills are extended    Follow up appointments -  highly encourage you to make it when you are checking out. We can accommodate you into the schedule based on your clinical situation, but not for extending refills beyond a year. Labs are important to give refills and is important to get labs before the visit     Phone calls  -  Allow  24 hrs.  for non-urgent calls to be returned  Prior authorization - It may take 2-4 weeks to process  Forms  -  FMLA, DMV etc., will take up to 2 weeks to process  Cancellations - please notify the office 2 days in advance   Samples  - will only be dispensed at visits       If not showing for the appointments and cancelling appointments within 24 hours are kept track of and three  of such situations in  two consecutive years will likely be considered for termination from the practice    -------------------------------------------------------------------------------------------------------------------

## 2023-02-03 NOTE — PROGRESS NOTES
HISTORY OF PRESENT ILLNESS  Jn Velasquez is a 74 y.o. male. HPI  Patient here for f/u after last visit of Type 1 diabetes mellitus   From   July 2022       Hunting season, lost control   Eats on move he says   Lost  2  lbs  Went to eye visit         July 2022     LOST 14 lbs ( he says I told him to )        Feb 2022     Gained 15 lbs   Hunting season  , so not paying attention to diet         Old history :    Referred : by pcp  H/o diabetes for 3  years  Current A1C is over 9%   and symptoms/problems include polyuria, polydipsia and visual disturbances   Current diabetic medications include metformin, prandin  4 mg TID  , lantus 30 units hs . Review of Systems   Constitutional: Negative. Psychiatric/Behavioral: Negative for depression and memory loss. The patient does not have insomnia. Phy exam   NAD    Lab Results   Component Value Date/Time    Hemoglobin A1c 8.2 (H) 01/27/2023 08:56 AM    Hemoglobin A1c 7.3 (H) 07/22/2022 12:00 AM    Hemoglobin A1c 8.3 (H) 02/18/2022 08:26 AM    Glucose 146 (H) 01/27/2023 08:56 AM    Glucose  05/11/2018 09:12 AM    Microalbumin/Creat ratio (mg/g creat) 5 01/27/2023 08:56 AM    Microalbumin,urine random 0.85 01/27/2023 08:56 AM    LDL, calculated 62.6 01/27/2023 08:56 AM    Creatinine 1.12 01/27/2023 08:56 AM      Lab Results   Component Value Date/Time    Cholesterol, total 136 01/27/2023 08:56 AM    HDL Cholesterol 60 01/27/2023 08:56 AM    LDL, calculated 62.6 01/27/2023 08:56 AM    Triglyceride 67 01/27/2023 08:56 AM    CHOL/HDL Ratio 2.3 01/27/2023 08:56 AM     Lab Results   Component Value Date/Time    ALT (SGPT) 24 01/27/2023 08:56 AM    Alk.  phosphatase 67 01/27/2023 08:56 AM    Bilirubin, total 0.6 01/27/2023 08:56 AM    Albumin 3.3 (L) 01/27/2023 08:56 AM    Protein, total 6.7 01/27/2023 08:56 AM     Lab Results   Component Value Date/Time    GFR est non-AA >60 02/18/2022 08:26 AM    GFR est AA >60 02/18/2022 08:26 AM    Creatinine 1.12 01/27/2023 08:56 AM    BUN 12 01/27/2023 08:56 AM    Sodium 139 01/27/2023 08:56 AM    Potassium 4.5 01/27/2023 08:56 AM    Chloride 108 01/27/2023 08:56 AM    CO2 26 01/27/2023 08:56 AM     ASSESSMENT and PLAN  1. Type 1 DM, un controlled :  DIVINA    A1c is  8.2 %   from   jan 2023   compared to a1c  Is   7.3 %   from    July 2022   compared to   8.3 %  From  Feb 2022  Compared to  7.9 %   From august 2021   Compared  To  7.6 %  Feb 2021  Compared to  8.3 %   From august 2020  Compared to a1c is 7.3 %   From sept 2019  a1c is 7.9 %   From march 2019 ; a1c is 7.2 %     From sept 2018     comapred to  a1c  7.3 %  Today  May 2018 a1c is 9.6 %   From jan 2018  compared to A1c is  8.5 %          Feb 2023   Lost  glycemic control,   DIET is not good because of hunting seaseon    He wants to do better next time   Continue on  lantus to 20 units  and humalog and metformin   He likes to stay on  metformin plain , not ER ( I stopped it because of him being DIVINA and that made his sugars go high)     Discussed pump option, not very favorable ; So is with CGM        July 2022    Improved   glycemic control,   DIET got  better , and he kept up his promise   Continue on  lantus to 20 units  and humalog and metformin   He likes to stay on  metformin plain , not ER ( I stopped it because of him being DIVINA and that made his sugars go high)     Discussed pump option, not very favorable ; So is with CGM        2. Hypoglycemia :  Educated on treating the hypoglycemia. Discussed insulin use and gave glucagon      3. HTN :  Uncontrolled,  Increased  prinivil 5  mg  ,, by mistake he is taking 2.5 mg a  Day      4. Dyslipidemia : continue lipitor     5. use of aspirin to prevent MI and TIA's discussed    6. Neuropathy  : not using neurontin       7.  Nov 2022 - had eye visit          Reviewed results with patient and discussed the labs being ordered today/bnv  Patient voiced understanding of plan of care

## 2023-02-03 NOTE — PROGRESS NOTES
Bevely Lennox is a 74 y.o. male here for   Chief Complaint   Patient presents with    Diabetes       1. Have you been to the ER, urgent care clinic since your last visit? Hospitalized since your last visit? - No     2. Have you seen or consulted any other health care providers outside of the [address] [place] [address] since your last visit?   Include any pap smears or colon screening.- PCP

## 2023-02-03 NOTE — LETTER
2/5/2023    Patient: Ryland Pat   YOB: 1955   Date of Visit: 2/3/2023     [facility] [address] [place] [phone]  Via Fax: [phone]    Dear Heather Rod DO,      Thank you for referring Mr. Lucretia Solomon to [address] [place] [address] for evaluation. My notes for this consultation are attached. If you have questions, please do not hesitate to call me. I look forward to following your patient along with you.       Sincerely,    Cesar Kehr, MD

## 2023-03-14 DIAGNOSIS — I10 ESSENTIAL HYPERTENSION: ICD-10-CM

## 2023-03-14 DIAGNOSIS — E78.2 MIXED HYPERLIPIDEMIA: ICD-10-CM

## 2023-03-14 DIAGNOSIS — Z79.4 ENCOUNTER FOR LONG-TERM (CURRENT) USE OF INSULIN (HCC): ICD-10-CM

## 2023-03-14 DIAGNOSIS — E10.65 HYPERGLYCEMIA DUE TO TYPE 1 DIABETES MELLITUS (HCC): ICD-10-CM

## 2023-03-14 RX ORDER — INSULIN LISPRO 100 [IU]/ML
INJECTION, SOLUTION INTRAVENOUS; SUBCUTANEOUS
Qty: 15 ML | Refills: 5 | Status: SHIPPED | OUTPATIENT
Start: 2023-03-14

## 2023-08-10 DIAGNOSIS — E78.2 MIXED HYPERLIPIDEMIA: ICD-10-CM

## 2023-08-10 DIAGNOSIS — E10.65 TYPE 1 DIABETES MELLITUS WITH HYPERGLYCEMIA (HCC): Primary | ICD-10-CM

## 2023-08-11 ENCOUNTER — NURSE ONLY (OUTPATIENT)
Age: 68
End: 2023-08-11

## 2023-08-11 DIAGNOSIS — E78.2 MIXED HYPERLIPIDEMIA: ICD-10-CM

## 2023-08-11 DIAGNOSIS — E10.65 TYPE 1 DIABETES MELLITUS WITH HYPERGLYCEMIA (HCC): ICD-10-CM

## 2023-08-12 LAB
ALBUMIN SERPL-MCNC: 3.3 G/DL (ref 3.5–5)
ALBUMIN/GLOB SERPL: 1 (ref 1.1–2.2)
ALP SERPL-CCNC: 61 U/L (ref 45–117)
ALT SERPL-CCNC: 18 U/L (ref 12–78)
ANION GAP SERPL CALC-SCNC: 5 MMOL/L (ref 5–15)
AST SERPL-CCNC: 12 U/L (ref 15–37)
BILIRUB SERPL-MCNC: 0.3 MG/DL (ref 0.2–1)
BUN SERPL-MCNC: 17 MG/DL (ref 6–20)
BUN/CREAT SERPL: 15 (ref 12–20)
CALCIUM SERPL-MCNC: 8.6 MG/DL (ref 8.5–10.1)
CHLORIDE SERPL-SCNC: 106 MMOL/L (ref 97–108)
CHOLEST SERPL-MCNC: 120 MG/DL
CO2 SERPL-SCNC: 25 MMOL/L (ref 21–32)
CREAT SERPL-MCNC: 1.14 MG/DL (ref 0.7–1.3)
CREAT UR-MCNC: 237 MG/DL
EST. AVERAGE GLUCOSE BLD GHB EST-MCNC: 169 MG/DL
GLOBULIN SER CALC-MCNC: 3.4 G/DL (ref 2–4)
GLUCOSE SERPL-MCNC: 192 MG/DL (ref 65–100)
HBA1C MFR BLD: 7.5 % (ref 4–5.6)
HDLC SERPL-MCNC: 54 MG/DL
HDLC SERPL: 2.2 (ref 0–5)
LDLC SERPL CALC-MCNC: 55.6 MG/DL (ref 0–100)
MICROALBUMIN UR-MCNC: 1.09 MG/DL
MICROALBUMIN/CREAT UR-RTO: 5 MG/G (ref 0–30)
POTASSIUM SERPL-SCNC: 4.5 MMOL/L (ref 3.5–5.1)
PROT SERPL-MCNC: 6.7 G/DL (ref 6.4–8.2)
SODIUM SERPL-SCNC: 136 MMOL/L (ref 136–145)
TRIGL SERPL-MCNC: 52 MG/DL
VLDLC SERPL CALC-MCNC: 10.4 MG/DL

## 2023-08-18 ENCOUNTER — OFFICE VISIT (OUTPATIENT)
Age: 68
End: 2023-08-18
Payer: COMMERCIAL

## 2023-08-18 VITALS
TEMPERATURE: 96.7 F | SYSTOLIC BLOOD PRESSURE: 137 MMHG | HEIGHT: 72 IN | DIASTOLIC BLOOD PRESSURE: 75 MMHG | RESPIRATION RATE: 20 BRPM | HEART RATE: 79 BPM | WEIGHT: 225 LBS | BODY MASS INDEX: 30.48 KG/M2 | OXYGEN SATURATION: 99 %

## 2023-08-18 DIAGNOSIS — E10.65 TYPE 1 DIABETES MELLITUS WITH HYPERGLYCEMIA (HCC): Primary | ICD-10-CM

## 2023-08-18 DIAGNOSIS — G62.9 POLYNEUROPATHY, UNSPECIFIED: ICD-10-CM

## 2023-08-18 DIAGNOSIS — Z79.4 LONG TERM (CURRENT) USE OF INSULIN (HCC): ICD-10-CM

## 2023-08-18 DIAGNOSIS — E78.2 MIXED HYPERLIPIDEMIA: ICD-10-CM

## 2023-08-18 PROCEDURE — 99214 OFFICE O/P EST MOD 30 MIN: CPT | Performed by: INTERNAL MEDICINE

## 2023-08-18 PROCEDURE — 3078F DIAST BP <80 MM HG: CPT | Performed by: INTERNAL MEDICINE

## 2023-08-18 PROCEDURE — 3051F HG A1C>EQUAL 7.0%<8.0%: CPT | Performed by: INTERNAL MEDICINE

## 2023-08-18 PROCEDURE — 1123F ACP DISCUSS/DSCN MKR DOCD: CPT | Performed by: INTERNAL MEDICINE

## 2023-08-18 PROCEDURE — 3075F SYST BP GE 130 - 139MM HG: CPT | Performed by: INTERNAL MEDICINE

## 2023-08-18 RX ORDER — LISINOPRIL 5 MG/1
TABLET ORAL
Qty: 90 TABLET | Refills: 3 | Status: SHIPPED | OUTPATIENT
Start: 2023-08-18

## 2023-08-18 RX ORDER — INSULIN LISPRO 100 [IU]/ML
INJECTION, SOLUTION INTRAVENOUS; SUBCUTANEOUS
Qty: 15 ML | Refills: 8 | Status: SHIPPED | OUTPATIENT
Start: 2023-08-18

## 2023-08-18 RX ORDER — AMLODIPINE BESYLATE 5 MG/1
5 TABLET ORAL DAILY
Qty: 90 TABLET | Refills: 3 | Status: SHIPPED | OUTPATIENT
Start: 2023-08-18

## 2023-08-18 RX ORDER — INSULIN GLARGINE 100 [IU]/ML
INJECTION, SOLUTION SUBCUTANEOUS
Qty: 15 ML | Refills: 5 | Status: SHIPPED | OUTPATIENT
Start: 2023-08-18

## 2023-08-18 RX ORDER — ATORVASTATIN CALCIUM 40 MG/1
TABLET, FILM COATED ORAL
Qty: 90 TABLET | Refills: 3 | Status: SHIPPED | OUTPATIENT
Start: 2023-08-18

## 2023-08-18 NOTE — PROGRESS NOTES
Tressa York MD FACE           HISTORY OF PRESENT ILLNESS  [place] is a 74 y.o. male. HPI  Patient here for f/u after last visit of Type 1 diabetes mellitus   From   feb 2023       He comes in with meter       Feb 2023     [address] season, lost control   Eats on move he says   Lost  2  lbs  Went to eye visit     Old history :    Referred : by pcp  H/o diabetes for 3  years  Current A1C is over 9%   and symptoms/problems include polyuria, polydipsia and visual disturbances   Current diabetic medications include metformin, prandin  4 mg TID  , lantus 30 units hs . Review of Systems   Constitutional: Negative. Psychiatric/Behavioral: Negative for depression and memory loss. The patient does not have insomnia. Phy exam : NAD    Labs  :   Diabetes    Lab Results   Component Value Date    LABA1C 7.5 (H) 08/11/2023    LABA1C 8.2 (H) 01/27/2023    LABA1C 7.3 (H) 07/22/2022       Lab Results   Component Value Date     08/11/2023    K 4.5 08/11/2023     08/11/2023    CO2 25 08/11/2023    BUN 17 08/11/2023    CREATININE 1.14 08/11/2023    GLUCOSE 192 (H) 08/11/2023    CALCIUM 8.6 08/11/2023    PROT 6.7 08/11/2023    LABALBU 3.3 (L) 08/11/2023    BILITOT 0.3 08/11/2023    ALKPHOS 61 08/11/2023    AST 12 (L) 08/11/2023    ALT 18 08/11/2023    LABGLOM >60 08/11/2023    GFRAA >60 02/18/2022    AGRATIO 1.0 (L) 01/27/2023    GLOB 3.4 08/11/2023    CHOL 120 08/11/2023    TRIG 52 08/11/2023    LDLCALC 55.6 08/11/2023    HDL 54 08/11/2023       Lab Results   Component Value Date    MALBCR 5 08/11/2023      ASSESSMENT and PLAN      1.  Type 1 DM, un controlled :  GIANNI    A1c is  7.5 %    from    august 2023   compared to    8.2 %   from   jan 2023   compared to a1c  Is   7.3 %   from    July 2022   compared to   8.3 %  From  Feb 2022  Compared to  7.9 %   From august 2021   Compared  To  7.6 %  Feb 2021  Compared to  8.3 %   From august 2020

## 2023-08-18 NOTE — PATIENT INSTRUCTIONS
SPECIFIC INSTRUCTIONS BELOW             -------------PAY ATTENTION TO THESE GENERAL INSTRUCTIONS -----------------      - The medications prescribed at this visit will not be available at pharmacy until 6 pm       - YOUR MED LIST IS NOT UP TO DATE AS SOME CHANGES ARE BEING MADE AFTER THE VISIT - FOLLOW SPECIFIC INSTRUCTIONS  ABOVE     -ANY tests other than blood work, which you opt to do  outside the  [facility] imaging facilities, you are responsible for prior authorizations if  required    - [address] [place] [address] Drive AVS- PLEASE IGNORE     Results     *Normal results will not be notified by a phone call starting January 1 2021   *If you have an upcoming visit, the results will be discussed at the visit   *Please sign up for MY CHART if you want access to your lab and test results  *Abnormal results which require immediate attention will be notified by phone call   *Abnormal results which do not require immediate assistance will be notified in 1-2 weeks       Refills    -    have your pharmacy send us a refill request . Refills are done max for one year and a visit is a must before refills are extended    Follow up appointments -  highly encourage you to make it when you are checking out. We can accommodate you into the schedule based on your clinical situation, but not for extending refills beyond a year. Labs are important to give refills and is important to get labs before the visit     Phone calls  -  Allow  24 hrs.  for non-urgent calls to be returned  Prior authorization - It may take 2-4 weeks to process  Forms  -  FMLA, DMV etc., will take up to 2 weeks to process  Cancellations - please notify the office 2 days in advance   Samples  - will only be dispensed at visits       If not showing for the appointments and cancelling appointments within 24 hours are kept track of and three  of such situations in  two consecutive years will likely be considered for termination from the

## 2023-08-21 DIAGNOSIS — E10.65 TYPE 1 DIABETES MELLITUS WITH HYPERGLYCEMIA (HCC): Primary | ICD-10-CM

## 2023-08-21 RX ORDER — GLUCAGON INJECTION, SOLUTION 1 MG/.2ML
INJECTION, SOLUTION SUBCUTANEOUS
Qty: 0.2 ML | Refills: 1 | Status: SHIPPED | OUTPATIENT
Start: 2023-08-21

## 2023-10-10 RX ORDER — PEN NEEDLE, DIABETIC 32GX 5/32"
NEEDLE, DISPOSABLE MISCELLANEOUS
Qty: 500 EACH | Refills: 2 | Status: SHIPPED | OUTPATIENT
Start: 2023-10-10

## 2024-02-16 ENCOUNTER — NURSE ONLY (OUTPATIENT)
Age: 69
End: 2024-02-16

## 2024-02-16 DIAGNOSIS — E78.2 MIXED HYPERLIPIDEMIA: ICD-10-CM

## 2024-02-16 DIAGNOSIS — E10.65 TYPE 1 DIABETES MELLITUS WITH HYPERGLYCEMIA (HCC): ICD-10-CM

## 2024-02-16 DIAGNOSIS — Z79.4 LONG TERM (CURRENT) USE OF INSULIN (HCC): ICD-10-CM

## 2024-02-16 LAB
ALBUMIN SERPL-MCNC: 3.3 G/DL (ref 3.5–5)
ALBUMIN/GLOB SERPL: 0.9 (ref 1.1–2.2)
ALP SERPL-CCNC: 73 U/L (ref 45–117)
ALT SERPL-CCNC: 16 U/L (ref 12–78)
ANION GAP SERPL CALC-SCNC: 4 MMOL/L (ref 5–15)
AST SERPL-CCNC: 15 U/L (ref 15–37)
BILIRUB SERPL-MCNC: 0.7 MG/DL (ref 0.2–1)
BUN SERPL-MCNC: 17 MG/DL (ref 6–20)
BUN/CREAT SERPL: 13 (ref 12–20)
CALCIUM SERPL-MCNC: 9.4 MG/DL (ref 8.5–10.1)
CHLORIDE SERPL-SCNC: 105 MMOL/L (ref 97–108)
CHOLEST SERPL-MCNC: 144 MG/DL
CO2 SERPL-SCNC: 28 MMOL/L (ref 21–32)
CREAT SERPL-MCNC: 1.28 MG/DL (ref 0.7–1.3)
CREAT UR-MCNC: 143 MG/DL
EST. AVERAGE GLUCOSE BLD GHB EST-MCNC: 174 MG/DL
GLOBULIN SER CALC-MCNC: 3.7 G/DL (ref 2–4)
GLUCOSE SERPL-MCNC: 263 MG/DL (ref 65–100)
HBA1C MFR BLD: 7.7 % (ref 4–5.6)
HDLC SERPL-MCNC: 68 MG/DL
HDLC SERPL: 2.1 (ref 0–5)
LDLC SERPL CALC-MCNC: 62.8 MG/DL (ref 0–100)
MICROALBUMIN UR-MCNC: 0.79 MG/DL
MICROALBUMIN/CREAT UR-RTO: 6 MG/G (ref 0–30)
POTASSIUM SERPL-SCNC: 5.1 MMOL/L (ref 3.5–5.1)
PROT SERPL-MCNC: 7 G/DL (ref 6.4–8.2)
SODIUM SERPL-SCNC: 137 MMOL/L (ref 136–145)
TRIGL SERPL-MCNC: 66 MG/DL
VLDLC SERPL CALC-MCNC: 13.2 MG/DL

## 2024-02-23 ENCOUNTER — OFFICE VISIT (OUTPATIENT)
Age: 69
End: 2024-02-23
Payer: COMMERCIAL

## 2024-02-23 VITALS
BODY MASS INDEX: 32.23 KG/M2 | HEART RATE: 87 BPM | DIASTOLIC BLOOD PRESSURE: 77 MMHG | HEIGHT: 72 IN | RESPIRATION RATE: 18 BRPM | WEIGHT: 238 LBS | TEMPERATURE: 98.4 F | SYSTOLIC BLOOD PRESSURE: 146 MMHG

## 2024-02-23 DIAGNOSIS — E78.2 MIXED HYPERLIPIDEMIA: ICD-10-CM

## 2024-02-23 DIAGNOSIS — G62.9 POLYNEUROPATHY, UNSPECIFIED: ICD-10-CM

## 2024-02-23 DIAGNOSIS — E10.65 TYPE 1 DIABETES MELLITUS WITH HYPERGLYCEMIA (HCC): Primary | ICD-10-CM

## 2024-02-23 DIAGNOSIS — Z79.4 LONG TERM (CURRENT) USE OF INSULIN (HCC): ICD-10-CM

## 2024-02-23 PROCEDURE — 1123F ACP DISCUSS/DSCN MKR DOCD: CPT | Performed by: INTERNAL MEDICINE

## 2024-02-23 PROCEDURE — 99214 OFFICE O/P EST MOD 30 MIN: CPT | Performed by: INTERNAL MEDICINE

## 2024-02-23 PROCEDURE — 3077F SYST BP >= 140 MM HG: CPT | Performed by: INTERNAL MEDICINE

## 2024-02-23 PROCEDURE — 3051F HG A1C>EQUAL 7.0%<8.0%: CPT | Performed by: INTERNAL MEDICINE

## 2024-02-23 PROCEDURE — 3078F DIAST BP <80 MM HG: CPT | Performed by: INTERNAL MEDICINE

## 2024-02-23 RX ORDER — BLOOD SUGAR DIAGNOSTIC
STRIP MISCELLANEOUS
COMMUNITY
Start: 2023-11-29

## 2024-02-23 RX ORDER — TIMOLOL MALEATE 5 MG/ML
SOLUTION/ DROPS OPHTHALMIC
COMMUNITY
Start: 2024-02-02

## 2024-02-23 RX ORDER — LATANOPROST 50 UG/ML
SOLUTION/ DROPS OPHTHALMIC
COMMUNITY
Start: 2024-01-12

## 2024-02-23 RX ORDER — LANCETS 30 GAUGE
EACH MISCELLANEOUS
Qty: 100 EACH | Refills: 3 | Status: SHIPPED | OUTPATIENT
Start: 2024-02-23

## 2024-02-23 NOTE — PATIENT INSTRUCTIONS
SPECIFIC INSTRUCTIONS BELOW       90 day - ( metformin , lipitor and lisinopril ) - [facility]      lipitor 40 mg at night      lisinopril to 5 mg a day       metformin  Plain 1000 mg one pill twice a day with meals ( he likes plain)              30 day for insulin, pen needles   and testing supplies   chad      Check blood sugars immediately before each meal and at bedtime        continue   Lantus insulin  20  units  at bed time     Take  humalog 2-4  unit with b-fast  , 5 to 8 units with lunch  And 5 to 15  units with dinner  Based on carbs   To max dose of 50 units a day         Take additional humalog  as folllows with meals:     150-200 mg 1 units     201-250 mg 2 units     251-300 mg 3 units     301-350 mg 4 units     351-400 mg 5 units     401-450 mg 6 units     451-500 mg 7 units     Less than   70 -  No insulin               -------------PAY ATTENTION TO THESE GENERAL INSTRUCTIONS -----------------      - The medications prescribed at this visit will not be available at pharmacy until 6 pm       - YOUR MED LIST IS NOT UP TO DATE AS SOME CHANGES ARE BEING MADE AFTER THE VISIT - FOLLOW SPECIFIC INSTRUCTIONS  ABOVE     -ANY tests other than blood work, which you opt to do  outside the  [facility] imaging facilities, you are responsible for prior authorizations if  required    - HEALTH MAINTENANCE IS NOT GOING TO BE UP TO DATE ON YOUR AVS- PLEASE IGNORE     Results     *Normal results will not be notified by a phone call starting January 1 2021   *If you have an upcoming visit, the results will be discussed at the visit   *Please sign up for MY CHART if you want access to your lab and test results  *Abnormal results which require immediate attention will be notified by phone call   *Abnormal results which do not require immediate assistance will be notified in 1-2 weeks       Refills    -    have your pharmacy send us a refill request . Refills are done max for one year and a visit is a must before

## 2024-02-23 NOTE — PROGRESS NOTES
[facility] DIABETES AND ENDOCRINOLOGY              Ameena Bhardwaj MD FACE           HISTORY OF PRESENT ILLNESS  Enrique Balbuena is a 67 y.o. male.  HPI  Patient here for f/u after last visit of Type 1 diabetes mellitus   From   feb 2023       He comes in with meter   Log shows sugars  100 to  240 mg - he checks mostly fasting       Feb 2023     Hunting season, lost control   Eats on move he says   Lost  2  lbs  Went to eye visit     Old history :    Referred : by pcp  H/o diabetes for 3  years  Current A1C is over 9%   and symptoms/problems include polyuria, polydipsia and visual disturbances   Current diabetic medications include metformin, prandin  4 mg TID  , lantus 30 units hs .         Review of Systems   Constitutional: Negative.    Psychiatric/Behavioral: Negative for depression and memory loss. The patient does not have insomnia.      Phy exam : NAD    Lab Results   Component Value Date    LABA1C 7.7 (H) 02/16/2024    LABA1C 7.5 (H) 08/11/2023    LABA1C 8.2 (H) 01/27/2023     Lab Results   Component Value Date     02/16/2024    K 5.1 02/16/2024     02/16/2024    CO2 28 02/16/2024    BUN 17 02/16/2024    CREATININE 1.28 02/16/2024    GLUCOSE 263 (H) 02/16/2024    CALCIUM 9.4 02/16/2024    PROT 7.0 02/16/2024    LABALBU 3.3 (L) 02/16/2024    BILITOT 0.7 02/16/2024    ALKPHOS 73 02/16/2024    AST 15 02/16/2024    ALT 16 02/16/2024    LABGLOM >60 02/16/2024    GFRAA >60 02/18/2022    AGRATIO 0.9 (L) 02/16/2024    GLOB 3.7 02/16/2024    CHOL 144 02/16/2024    TRIG 66 02/16/2024    LDLCALC 62.8 02/16/2024    HDL 68 02/16/2024       Lab Results   Component Value Date    MALBCR 6 02/16/2024      ASSESSMENT and PLAN      1. Type 1 DM, un controlled :  GIANNI    A1c is    7.7 %     from     feb 2024   compared to  7.5 %    from    august 2023   compared to    8.2 %   from   jan 2023   compared to a1c  Is   7.3 %   from    July 2022   compared to   8.3 %  From  Feb 2022  Compared to  7.9 %   From

## 2024-02-23 NOTE — PROGRESS NOTES
Identified pt with two pt identifiers(name and ). Reviewed record in preparation for visit and have obtained necessary documentation.  Chief Complaint   Patient presents with    Diabetes        Health Maintenance Due   Topic    COVID-19 Vaccine (1)    Pneumococcal 65+ years Vaccine (1 - PCV)    Hepatitis C screen     DTaP/Tdap/Td vaccine (1 - Tdap)    Colorectal Cancer Screen     Shingles vaccine (1 of 2)    Respiratory Syncytial Virus (RSV) Pregnant or age 60 yrs+ (1 - 1-dose 60+ series)    Diabetic foot exam     Flu vaccine (1)    Diabetic retinal exam     Depression Screen        Vitals:    24 0837 24 0839   BP: (!) 168/89 (!) 146/77   Site: Right Upper Arm    Position: Sitting    Cuff Size: Large Adult    Pulse: 84 87   Resp: 18    Temp: 98.4 °F (36.9 °C)    TempSrc: Oral    Weight: 108 kg (238 lb)    Height: 1.829 m (6')         Coordination of Care Questionnaire:  :   1) Have you been to an emergency room, urgent care, or hospitalized since your last visit?  If yes, where when, and reason for visit? No    2. Have seen or consulted any other health care provider since your last visit?   If yes, where when, and reason for visit?  No      3)Patient is accompanied by self I have received verbal consent from Enrique Balbuena to discuss any/all medical information while they are present in the room.

## 2024-03-18 DIAGNOSIS — E10.65 TYPE 1 DIABETES MELLITUS WITH HYPERGLYCEMIA (HCC): Primary | ICD-10-CM

## 2024-03-18 RX ORDER — INSULIN LISPRO 100 [IU]/ML
INJECTION, SOLUTION INTRAVENOUS; SUBCUTANEOUS
Qty: 15 ML | Refills: 8 | Status: SHIPPED | OUTPATIENT
Start: 2024-03-18

## 2024-07-29 DIAGNOSIS — E10.65 TYPE 1 DIABETES MELLITUS WITH HYPERGLYCEMIA (HCC): Primary | ICD-10-CM

## 2024-07-29 RX ORDER — BLOOD SUGAR DIAGNOSTIC
STRIP MISCELLANEOUS
Qty: 400 STRIP | Refills: 3 | Status: SHIPPED | OUTPATIENT
Start: 2024-07-29

## 2024-08-20 DIAGNOSIS — I10 ESSENTIAL (PRIMARY) HYPERTENSION: ICD-10-CM

## 2024-08-20 DIAGNOSIS — E78.2 MIXED HYPERLIPIDEMIA: ICD-10-CM

## 2024-08-20 DIAGNOSIS — E10.65 TYPE 1 DIABETES MELLITUS WITH HYPERGLYCEMIA (HCC): Primary | ICD-10-CM

## 2024-08-20 RX ORDER — LISINOPRIL 5 MG/1
TABLET ORAL
Qty: 90 TABLET | Refills: 3 | Status: SHIPPED | OUTPATIENT
Start: 2024-08-20

## 2024-08-23 ENCOUNTER — LAB (OUTPATIENT)
Age: 69
End: 2024-08-23

## 2024-08-23 DIAGNOSIS — Z79.4 LONG TERM (CURRENT) USE OF INSULIN (HCC): ICD-10-CM

## 2024-08-23 DIAGNOSIS — G62.9 POLYNEUROPATHY, UNSPECIFIED: ICD-10-CM

## 2024-08-23 DIAGNOSIS — E10.65 TYPE 1 DIABETES MELLITUS WITH HYPERGLYCEMIA (HCC): ICD-10-CM

## 2024-08-23 DIAGNOSIS — E78.2 MIXED HYPERLIPIDEMIA: ICD-10-CM

## 2024-08-23 LAB
ALBUMIN SERPL-MCNC: 3.1 G/DL (ref 3.5–5)
ALBUMIN/GLOB SERPL: 0.9 (ref 1.1–2.2)
ALP SERPL-CCNC: 71 U/L (ref 45–117)
ALT SERPL-CCNC: 18 U/L (ref 12–78)
ANION GAP SERPL CALC-SCNC: 3 MMOL/L (ref 5–15)
AST SERPL-CCNC: 18 U/L (ref 15–37)
BILIRUB SERPL-MCNC: 0.4 MG/DL (ref 0.2–1)
BUN SERPL-MCNC: 17 MG/DL (ref 6–20)
BUN/CREAT SERPL: 15 (ref 12–20)
CALCIUM SERPL-MCNC: 8.7 MG/DL (ref 8.5–10.1)
CHLORIDE SERPL-SCNC: 109 MMOL/L (ref 97–108)
CHOLEST SERPL-MCNC: 133 MG/DL
CO2 SERPL-SCNC: 26 MMOL/L (ref 21–32)
CREAT SERPL-MCNC: 1.17 MG/DL (ref 0.7–1.3)
CREAT UR-MCNC: 156 MG/DL
EST. AVERAGE GLUCOSE BLD GHB EST-MCNC: 183 MG/DL
GLOBULIN SER CALC-MCNC: 3.5 G/DL (ref 2–4)
GLUCOSE SERPL-MCNC: 139 MG/DL (ref 65–100)
HBA1C MFR BLD: 8 % (ref 4–5.6)
HDLC SERPL-MCNC: 60 MG/DL
HDLC SERPL: 2.2 (ref 0–5)
LDLC SERPL CALC-MCNC: 63.6 MG/DL (ref 0–100)
MICROALBUMIN UR-MCNC: 0.96 MG/DL
MICROALBUMIN/CREAT UR-RTO: 6 MG/G (ref 0–30)
POTASSIUM SERPL-SCNC: 4.3 MMOL/L (ref 3.5–5.1)
PROT SERPL-MCNC: 6.6 G/DL (ref 6.4–8.2)
SODIUM SERPL-SCNC: 138 MMOL/L (ref 136–145)
TRIGL SERPL-MCNC: 47 MG/DL
VLDLC SERPL CALC-MCNC: 9.4 MG/DL

## 2024-08-30 ENCOUNTER — OFFICE VISIT (OUTPATIENT)
Age: 69
End: 2024-08-30
Payer: COMMERCIAL

## 2024-08-30 VITALS
OXYGEN SATURATION: 97 % | WEIGHT: 231.2 LBS | BODY MASS INDEX: 31.31 KG/M2 | HEART RATE: 79 BPM | DIASTOLIC BLOOD PRESSURE: 72 MMHG | HEIGHT: 72 IN | SYSTOLIC BLOOD PRESSURE: 130 MMHG | TEMPERATURE: 98 F

## 2024-08-30 DIAGNOSIS — E10.65 TYPE 1 DIABETES MELLITUS WITH HYPERGLYCEMIA (HCC): Primary | ICD-10-CM

## 2024-08-30 DIAGNOSIS — E78.2 MIXED HYPERLIPIDEMIA: ICD-10-CM

## 2024-08-30 DIAGNOSIS — I10 ESSENTIAL (PRIMARY) HYPERTENSION: ICD-10-CM

## 2024-08-30 DIAGNOSIS — Z79.4 LONG TERM (CURRENT) USE OF INSULIN (HCC): ICD-10-CM

## 2024-08-30 PROCEDURE — 3075F SYST BP GE 130 - 139MM HG: CPT | Performed by: INTERNAL MEDICINE

## 2024-08-30 PROCEDURE — 3078F DIAST BP <80 MM HG: CPT | Performed by: INTERNAL MEDICINE

## 2024-08-30 PROCEDURE — 3052F HG A1C>EQUAL 8.0%<EQUAL 9.0%: CPT | Performed by: INTERNAL MEDICINE

## 2024-08-30 PROCEDURE — 1123F ACP DISCUSS/DSCN MKR DOCD: CPT | Performed by: INTERNAL MEDICINE

## 2024-08-30 PROCEDURE — 99214 OFFICE O/P EST MOD 30 MIN: CPT | Performed by: INTERNAL MEDICINE

## 2024-08-30 RX ORDER — ATORVASTATIN CALCIUM 40 MG/1
TABLET, FILM COATED ORAL
Qty: 90 TABLET | Refills: 3 | Status: SHIPPED | OUTPATIENT
Start: 2024-08-30

## 2024-08-30 RX ORDER — AMLODIPINE BESYLATE 10 MG/1
TABLET ORAL
Qty: 90 TABLET | Refills: 3 | Status: SHIPPED | OUTPATIENT
Start: 2024-08-30

## 2024-08-30 RX ORDER — INSULIN GLARGINE 100 [IU]/ML
INJECTION, SOLUTION SUBCUTANEOUS
Qty: 15 ML | Refills: 5 | Status: SHIPPED | OUTPATIENT
Start: 2024-08-30

## 2024-08-30 NOTE — PROGRESS NOTES
[facility] DIABETES AND ENDOCRINOLOGY              Ameena Bhardwaj MD FACE           HISTORY OF PRESENT ILLNESS  Enrique Balbuena is a 68  y.o. male.  HPI  Patient here for f/u after last visit of Type 1 diabetes mellitus   From Feb 2024     Sugars are touching 250         Feb 2024     He comes in with meter   Log shows sugars  100 to  240 mg - he checks mostly fasting       Feb 2023     Hunting season, lost control   Eats on move he says   Lost  2  lbs  Went to eye visit     Old history :    Referred : by pcp  H/o diabetes for 3  years  Current A1C is over 9%   and symptoms/problems include polyuria, polydipsia and visual disturbances   Current diabetic medications include metformin, prandin  4 mg TID  , lantus 30 units hs .         Review of Systems   Constitutional: Negative.    Psychiatric/Behavioral: Negative for depression and memory loss. The patient does not have insomnia.      Phy exam : NAD    Lab Results   Component Value Date    LABA1C 8.0 (H) 08/23/2024    LABA1C 7.7 (H) 02/16/2024    LABA1C 7.5 (H) 08/11/2023     Lab Results   Component Value Date     08/23/2024    K 4.3 08/23/2024     (H) 08/23/2024    CO2 26 08/23/2024    BUN 17 08/23/2024    CREATININE 1.17 08/23/2024    GLUCOSE 139 (H) 08/23/2024    CALCIUM 8.7 08/23/2024    BILITOT 0.4 08/23/2024    ALKPHOS 71 08/23/2024    AST 18 08/23/2024    ALT 18 08/23/2024    LABGLOM 69 08/23/2024    GFRAA >60 02/18/2022    AGRATIO 1.0 (L) 01/27/2023    GLOB 3.5 08/23/2024    CHOL 133 08/23/2024    TRIG 47 08/23/2024    LDL 63.6 08/23/2024    HDL 60 08/23/2024       Lab Results   Component Value Date    MALBCR 6 08/23/2024      ASSESSMENT and PLAN      1. Type 1 DM, un controlled :  GIANNI    A1c is 8 %  from  August 2024  ; A1c is    7.7 %     from     feb 2024   compared to  7.5 %    from    august 2023   compared to    8.2 %   from   jan 2023   compared to a1c  Is   7.3 %   from    July 2022   compared to   8.3 %  From  Feb 2022   Compared to  7.9 %   From august 2021   Compared  To  7.6 %  Feb 2021  Compared to  8.3 %   From august 2020  Compared to a1c is 7.3 %   From sept 2019  a1c is 7.9 %   From march 2019 ; a1c is 7.2 %     From sept 2018     comapred to  a1c  7.3 %  Today  May 2018 a1c is 9.6 %   From jan 2018  compared to A1c is  8.5 %            August 2024   More loss  of   glycemic control    Continue on  lantus to 20 units  and humalog and metformin   Suggested using RENAE   Suggested considering insulin pump or at least trying CGM    Pt dislikes gadgets , asking him to atleast learn about insulin pump  As the sugars vary with food, it is an art he has to develop to adjust insulins to foods / sugars         Feb 2024   Slight loss   glycemic control    Continue on  lantus to 20 units  and humalog and metformin   Suggested using RENAE   Suggested considering insulin pump or at least trying CGM        August 2023     Improved glycemic control    Continue on  lantus to 20 units  and humalog and metformin         Feb 2023   Lost  glycemic control,   DIET is not good because of hunting seaseon    He wants to do better next time   Continue on  lantus to 20 units  and humalog and metformin   He likes to stay on  metformin plain , not ER ( I stopped it because of him being GIANNI and that made his sugars go high)   Discussed pump option, not very favorable ; So is with CGM      2. Hypoglycemia :  Educated on treating the hypoglycemia. Discussed insulin use and gave glucagon      3. HTN : uncontrolled,  stay on prinivil 5  mg  a  Day      4.  Dyslipidemia : continue lipitor     5. use of aspirin to prevent MI and TIA's discussed    6. Neuropathy  : not using neurontin       7. Nov 2022 - had eye visit        8. LOW albumin  -  he has no urine protein         Reviewed results with patient and discussed the labs being ordered today/bnv  Patient voiced understanding of plan of care

## 2024-08-30 NOTE — PATIENT INSTRUCTIONS
SPECIFIC INSTRUCTIONS BELOW       90 day - ( metformin , lipitor and lisinopril, amlodipine  ) - [facility]      lipitor 40 mg at night      lisinopril to 5 mg a day       metformin  Plain 1000 mg one pill twice a day with meals ( he likes plain)              30 day for insulin, pen needles   and testing supplies   chad      Check blood sugars immediately before each meal and at bedtime        continue   Lantus insulin  20  units  at bed time     Take  humalog 2-4  unit with b-fast  , 5 to 8 units with lunch  And 5 to 15  units with dinner  Based on carbs   To max dose of 50 units a day         Take additional humalog  as folllows with meals:     150-200 mg 1 units     201-250 mg 2 units     251-300 mg 3 units     301-350 mg 4 units     351-400 mg 5 units     401-450 mg 6 units     451-500 mg 7 units     Less than   70 -  No insulin               -------------PAY ATTENTION TO THESE GENERAL INSTRUCTIONS -----------------      - The medications prescribed at this visit will not be available at pharmacy until 6 pm       - YOUR MED LIST IS NOT UP TO DATE AS SOME CHANGES ARE BEING MADE AFTER THE VISIT - FOLLOW SPECIFIC INSTRUCTIONS  ABOVE     -ANY tests other than blood work, which you opt to do  outside the  [facility] imaging facilities, you are responsible for prior authorizations if  required    - HEALTH MAINTENANCE IS NOT GOING TO BE UP TO DATE ON YOUR AVS- PLEASE IGNORE     Results     *Normal results will not be notified by a phone call starting January 1 2021   *If you have an upcoming visit, the results will be discussed at the visit   *Please sign up for MY CHART if you want access to your lab and test results  *Abnormal results which require immediate attention will be notified by phone call   *Abnormal results which do not require immediate assistance will be notified in 1-2 weeks       Refills    -    have your pharmacy send us a refill request . Refills are done max for one year and a visit is a must  Bariatric Manual    You were provided a manual specific to the procedure that you have chosen.  Please refer to that with any questions or call the office at [phone]

## 2024-08-30 NOTE — PROGRESS NOTES
I have reviewed all needed documentation in preparation for visit. Verified patient by name and date of birth  Enrique Balbuena is a 68 y.o. male Diabetes (Type 1 diabetes mellitus with hyperglycemia (HCC))  .    Vitals:    08/30/24 0814   BP: 130/72   Site: Left Upper Arm   Pulse: 79   Temp: 98 °F (36.7 °C)   TempSrc: Temporal   SpO2: 97%   Weight: 104.9 kg (231 lb 3.2 oz)   Height: 1.829 m (6')       No LMP for male patient.         Health Maintenance Review: Patient reminded of \"due or due soon\" health maintenance. I have asked the patient to contact his/her primary care provider (PCP) for follow-up on his/her health maintenance.    \"Have you been to the ER, urgent care clinic since your last visit?  Hospitalized since your last visit?\"    NO    “Have you seen or consulted any other health care providers outside of [facility] since your last visit?”    NO

## 2024-11-06 DIAGNOSIS — E10.65 TYPE 1 DIABETES MELLITUS WITH HYPERGLYCEMIA (HCC): Primary | ICD-10-CM

## 2024-11-06 RX ORDER — PEN NEEDLE, DIABETIC 32GX 5/32"
NEEDLE, DISPOSABLE MISCELLANEOUS
Qty: 500 EACH | Refills: 2 | Status: SHIPPED | OUTPATIENT
Start: 2024-11-06

## 2024-11-06 RX ORDER — PEN NEEDLE, DIABETIC 32GX 5/32"
NEEDLE, DISPOSABLE MISCELLANEOUS
Qty: 500 EACH | Refills: 2 | OUTPATIENT
Start: 2024-11-06

## 2025-02-28 ENCOUNTER — LAB (OUTPATIENT)
Age: 70
End: 2025-02-28

## 2025-02-28 DIAGNOSIS — Z79.4 LONG TERM (CURRENT) USE OF INSULIN (HCC): ICD-10-CM

## 2025-02-28 DIAGNOSIS — I10 ESSENTIAL (PRIMARY) HYPERTENSION: ICD-10-CM

## 2025-02-28 DIAGNOSIS — E10.65 TYPE 1 DIABETES MELLITUS WITH HYPERGLYCEMIA (HCC): ICD-10-CM

## 2025-02-28 DIAGNOSIS — E78.2 MIXED HYPERLIPIDEMIA: ICD-10-CM

## 2025-03-01 LAB
ALBUMIN SERPL-MCNC: 3.3 G/DL (ref 3.5–5)
ALBUMIN/GLOB SERPL: 0.8 (ref 1.1–2.2)
ALP SERPL-CCNC: 84 U/L (ref 45–117)
ALT SERPL-CCNC: 22 U/L (ref 12–78)
ANION GAP SERPL CALC-SCNC: 6 MMOL/L (ref 2–12)
AST SERPL-CCNC: 19 U/L (ref 15–37)
BILIRUB SERPL-MCNC: 0.5 MG/DL (ref 0.2–1)
BUN SERPL-MCNC: 18 MG/DL (ref 6–20)
BUN/CREAT SERPL: 16 (ref 12–20)
CALCIUM SERPL-MCNC: 9.7 MG/DL (ref 8.5–10.1)
CHLORIDE SERPL-SCNC: 106 MMOL/L (ref 97–108)
CHOLEST SERPL-MCNC: 157 MG/DL
CO2 SERPL-SCNC: 25 MMOL/L (ref 21–32)
CREAT SERPL-MCNC: 1.15 MG/DL (ref 0.7–1.3)
CREAT UR-MCNC: 188 MG/DL
EST. AVERAGE GLUCOSE BLD GHB EST-MCNC: 183 MG/DL
GLOBULIN SER CALC-MCNC: 4 G/DL (ref 2–4)
GLUCOSE SERPL-MCNC: 195 MG/DL (ref 65–100)
HBA1C MFR BLD: 8 % (ref 4–5.6)
HDLC SERPL-MCNC: 75 MG/DL
HDLC SERPL: 2.1 (ref 0–5)
LDLC SERPL CALC-MCNC: 68.8 MG/DL (ref 0–100)
MICROALBUMIN UR-MCNC: 1.63 MG/DL
MICROALBUMIN/CREAT UR-RTO: 9 MG/G (ref 0–30)
POTASSIUM SERPL-SCNC: 4.4 MMOL/L (ref 3.5–5.1)
PROT SERPL-MCNC: 7.3 G/DL (ref 6.4–8.2)
SODIUM SERPL-SCNC: 137 MMOL/L (ref 136–145)
TRIGL SERPL-MCNC: 66 MG/DL
VLDLC SERPL CALC-MCNC: 13.2 MG/DL

## 2025-03-07 ENCOUNTER — OFFICE VISIT (OUTPATIENT)
Age: 70
End: 2025-03-07
Payer: MEDICARE

## 2025-03-07 VITALS
TEMPERATURE: 97.5 F | BODY MASS INDEX: 32.02 KG/M2 | HEIGHT: 72 IN | SYSTOLIC BLOOD PRESSURE: 143 MMHG | OXYGEN SATURATION: 98 % | WEIGHT: 236.4 LBS | HEART RATE: 72 BPM | DIASTOLIC BLOOD PRESSURE: 83 MMHG

## 2025-03-07 DIAGNOSIS — E10.65 TYPE 1 DIABETES MELLITUS WITH HYPERGLYCEMIA (HCC): Primary | ICD-10-CM

## 2025-03-07 DIAGNOSIS — Z79.4 LONG TERM (CURRENT) USE OF INSULIN (HCC): ICD-10-CM

## 2025-03-07 DIAGNOSIS — I10 ESSENTIAL (PRIMARY) HYPERTENSION: ICD-10-CM

## 2025-03-07 DIAGNOSIS — E78.2 MIXED HYPERLIPIDEMIA: ICD-10-CM

## 2025-03-07 PROCEDURE — 3077F SYST BP >= 140 MM HG: CPT | Performed by: INTERNAL MEDICINE

## 2025-03-07 PROCEDURE — 3079F DIAST BP 80-89 MM HG: CPT | Performed by: INTERNAL MEDICINE

## 2025-03-07 PROCEDURE — 1123F ACP DISCUSS/DSCN MKR DOCD: CPT | Performed by: INTERNAL MEDICINE

## 2025-03-07 PROCEDURE — 1160F RVW MEDS BY RX/DR IN RCRD: CPT | Performed by: INTERNAL MEDICINE

## 2025-03-07 PROCEDURE — 1125F AMNT PAIN NOTED PAIN PRSNT: CPT | Performed by: INTERNAL MEDICINE

## 2025-03-07 PROCEDURE — 3052F HG A1C>EQUAL 8.0%<EQUAL 9.0%: CPT | Performed by: INTERNAL MEDICINE

## 2025-03-07 PROCEDURE — 1159F MED LIST DOCD IN RCRD: CPT | Performed by: INTERNAL MEDICINE

## 2025-03-07 PROCEDURE — 99215 OFFICE O/P EST HI 40 MIN: CPT | Performed by: INTERNAL MEDICINE

## 2025-03-07 RX ORDER — INSULIN GLARGINE 100 [IU]/ML
INJECTION, SOLUTION SUBCUTANEOUS
Qty: 15 ML | Refills: 5 | Status: SHIPPED | OUTPATIENT
Start: 2025-03-07

## 2025-03-07 RX ORDER — GLUCAGON INJECTION, SOLUTION 1 MG/.2ML
INJECTION, SOLUTION SUBCUTANEOUS
Qty: 0.2 ML | Refills: 1 | Status: SHIPPED | OUTPATIENT
Start: 2025-03-07

## 2025-03-07 RX ORDER — PEN NEEDLE, DIABETIC 32GX 5/32"
NEEDLE, DISPOSABLE MISCELLANEOUS
Qty: 500 EACH | Refills: 2 | Status: SHIPPED | OUTPATIENT
Start: 2025-03-07

## 2025-03-07 RX ORDER — BLOOD-GLUCOSE METER
1 EACH MISCELLANEOUS DAILY
Qty: 1 KIT | Refills: 0 | Status: SHIPPED | OUTPATIENT
Start: 2025-03-07

## 2025-03-07 RX ORDER — INSULIN LISPRO 100 [IU]/ML
INJECTION, SOLUTION INTRAVENOUS; SUBCUTANEOUS
Qty: 15 ML | Refills: 8 | Status: SHIPPED | OUTPATIENT
Start: 2025-03-07

## 2025-03-07 NOTE — PATIENT INSTRUCTIONS
SPECIFIC INSTRUCTIONS BELOW       90 day - ( metformin , lipitor and lisinopril, amlodipine  ) - [facility]      lipitor 40 mg at night      lisinopril to 5 mg a day       metformin  Plain 1000 mg one pill twice a day with meals ( he likes plain)              30 day for insulin, pen needles   and testing supplies   chad      Check blood sugars immediately before each meal and at bedtime        Spilt     Lantus insulin  10  units   in AM     and   at bed time     Take  humalog  4  unit with b-fast  , 6  units with lunch  And 10   units with dinner  Based on carbs       To max dose of 50 units a day         Take additional humalog  as folllows with meals:     150-200 mg 1 units     201-250 mg 2 units     251-300 mg 3 units     301-350 mg 4 units     351-400 mg 5 units     401-450 mg 6 units     451-500 mg 7 units     Less than   70 -  No insulin               -------------PAY ATTENTION TO THESE GENERAL INSTRUCTIONS -----------------      - The medications prescribed at this visit will not be available at pharmacy until 6 pm       - YOUR MED LIST IS NOT UP TO DATE AS SOME CHANGES ARE BEING MADE AFTER THE VISIT - FOLLOW SPECIFIC INSTRUCTIONS  ABOVE     -ANY tests other than blood work, which you opt to do  outside the  [facility] imaging facilities, you are responsible for prior authorizations if  required    - HEALTH MAINTENANCE IS NOT GOING TO BE UP TO DATE ON YOUR AVS- PLEASE IGNORE     Results     *Normal results will not be notified by a phone call starting January 1 2021   *If you have an upcoming visit, the results will be discussed at the visit   *Please sign up for MY CHART if you want access to your lab and test results  *Abnormal results which require immediate attention will be notified by phone call   *Abnormal results which do not require immediate assistance will be notified in 1-2 weeks       Refills    -    have your pharmacy send us a refill request . Refills are done max for one year and a visit

## 2025-03-07 NOTE — PROGRESS NOTES
[facility] DIABETES AND ENDOCRINOLOGY              Ameena Bhardwaj MD FACE           HISTORY OF PRESENT ILLNESS  Enrique Balbuena is a 69   y.o. male.  HPI  Patient here for f/u after last visit of Type 1 diabetes mellitus   From August 2024     Upon review of log,  he has several low sugars   to 40s    He feels them       August 2024     Sugars are touching 250         Feb 2024     He comes in with meter   Log shows sugars  100 to  240 mg - he checks mostly fasting       Feb 2023     Hunting season, lost control   Eats on move he says   Lost  2  lbs  Went to eye visit     Old history :    Referred : by pcp  H/o diabetes for 3  years  Current A1C is over 9%   and symptoms/problems include polyuria, polydipsia and visual disturbances   Current diabetic medications include metformin, prandin  4 mg TID  , lantus 30 units hs .         Review of Systems   Constitutional: Negative.    Psychiatric/Behavioral: Negative for depression and memory loss. The patient does not have insomnia.      Phy exam : NAD    Lab Results   Component Value Date    LABA1C 8.0 (H) 02/28/2025    LABA1C 8.0 (H) 08/23/2024    LABA1C 7.7 (H) 02/16/2024     Lab Results   Component Value Date     02/28/2025    K 4.4 02/28/2025     02/28/2025    CO2 25 02/28/2025    BUN 18 02/28/2025    CREATININE 1.15 02/28/2025    GLUCOSE 195 (H) 02/28/2025    CALCIUM 9.7 02/28/2025    BILITOT 0.5 02/28/2025    ALKPHOS 84 02/28/2025    AST 19 02/28/2025    ALT 22 02/28/2025    LABGLOM 70 02/28/2025    GFRAA >60 02/18/2022    AGRATIO 1.0 (L) 01/27/2023    GLOB 4.0 02/28/2025    CHOL 157 02/28/2025    TRIG 66 02/28/2025    LDL 68.8 02/28/2025    HDL 75 02/28/2025        ASSESSMENT and PLAN      1. Type 1 DM, un controlled :  GIANNI    A1c is 8 %   from march 2025    A1c is 8 %  from  August 2024  ; A1c is    7.7 %     from     feb 2024   compared to  7.5 %    from    august 2023   compared to    8.2 %   from   jan 2023   compared to a1c  Is   7.3 %

## 2025-03-07 NOTE — PROGRESS NOTES
Enrique Balbuena is a 69 y.o. male here for   Chief Complaint   Patient presents with    Diabetes       1. Have you been to the ER, urgent care clinic since your last visit?  Hospitalized since your last visit? -No    2. Have you seen or consulted any other health care providers outside of the [facility] since your last visit?  Include any pap smears or colon screening.-Dr. Vaughn

## 2025-03-18 DIAGNOSIS — E10.65 TYPE 1 DIABETES MELLITUS WITH HYPERGLYCEMIA (HCC): Primary | ICD-10-CM

## 2025-03-18 RX ORDER — INSULIN ASPART 100 [IU]/ML
INJECTION, SOLUTION INTRAVENOUS; SUBCUTANEOUS
Qty: 15 ML | Refills: 8 | Status: SHIPPED | OUTPATIENT
Start: 2025-03-18

## 2025-05-01 DIAGNOSIS — E78.2 MIXED HYPERLIPIDEMIA: ICD-10-CM

## 2025-05-01 RX ORDER — ATORVASTATIN CALCIUM 40 MG/1
TABLET, FILM COATED ORAL
Qty: 90 TABLET | Refills: 3 | Status: SHIPPED | OUTPATIENT
Start: 2025-05-01

## 2025-05-16 DIAGNOSIS — E10.65 TYPE 1 DIABETES MELLITUS WITH HYPERGLYCEMIA (HCC): ICD-10-CM

## 2025-05-16 RX ORDER — BLOOD SUGAR DIAGNOSTIC
STRIP MISCELLANEOUS
Qty: 400 STRIP | Refills: 3 | Status: SHIPPED | OUTPATIENT
Start: 2025-05-16

## 2025-05-16 RX ORDER — AVOBENZONE, HOMOSALATE, OCTISALATE, OCTOCRYLENE 30; 40; 45; 26 MG/ML; MG/ML; MG/ML; MG/ML
CREAM TOPICAL
Qty: 100 EACH | Refills: 3 | Status: SHIPPED | OUTPATIENT
Start: 2025-05-16

## 2025-05-30 ENCOUNTER — LAB (OUTPATIENT)
Age: 70
End: 2025-05-30

## 2025-05-30 DIAGNOSIS — E10.65 TYPE 1 DIABETES MELLITUS WITH HYPERGLYCEMIA (HCC): ICD-10-CM

## 2025-05-30 DIAGNOSIS — Z79.4 LONG TERM (CURRENT) USE OF INSULIN (HCC): ICD-10-CM

## 2025-06-01 LAB — HBA1C MFR BLD: 9.9 % (ref 4.8–5.6)

## 2025-06-06 ENCOUNTER — OFFICE VISIT (OUTPATIENT)
Age: 70
End: 2025-06-06
Payer: MEDICARE

## 2025-06-06 VITALS
TEMPERATURE: 98.1 F | DIASTOLIC BLOOD PRESSURE: 80 MMHG | HEART RATE: 84 BPM | BODY MASS INDEX: 31.07 KG/M2 | OXYGEN SATURATION: 98 % | HEIGHT: 72 IN | SYSTOLIC BLOOD PRESSURE: 143 MMHG | WEIGHT: 229.4 LBS

## 2025-06-06 DIAGNOSIS — E78.2 MIXED HYPERLIPIDEMIA: ICD-10-CM

## 2025-06-06 DIAGNOSIS — I10 ESSENTIAL (PRIMARY) HYPERTENSION: ICD-10-CM

## 2025-06-06 DIAGNOSIS — Z79.4 LONG TERM (CURRENT) USE OF INSULIN (HCC): ICD-10-CM

## 2025-06-06 DIAGNOSIS — E10.65 TYPE 1 DIABETES MELLITUS WITH HYPERGLYCEMIA (HCC): Primary | ICD-10-CM

## 2025-06-06 PROCEDURE — 3046F HEMOGLOBIN A1C LEVEL >9.0%: CPT | Performed by: INTERNAL MEDICINE

## 2025-06-06 PROCEDURE — 1123F ACP DISCUSS/DSCN MKR DOCD: CPT | Performed by: INTERNAL MEDICINE

## 2025-06-06 PROCEDURE — 99215 OFFICE O/P EST HI 40 MIN: CPT | Performed by: INTERNAL MEDICINE

## 2025-06-06 PROCEDURE — 3079F DIAST BP 80-89 MM HG: CPT | Performed by: INTERNAL MEDICINE

## 2025-06-06 PROCEDURE — 3077F SYST BP >= 140 MM HG: CPT | Performed by: INTERNAL MEDICINE

## 2025-06-06 PROCEDURE — 1125F AMNT PAIN NOTED PAIN PRSNT: CPT | Performed by: INTERNAL MEDICINE

## 2025-06-06 RX ORDER — BLOOD-GLUCOSE METER
EACH MISCELLANEOUS
Qty: 1 KIT | Refills: 1 | Status: SHIPPED | OUTPATIENT
Start: 2025-06-06

## 2025-06-06 RX ORDER — INSULIN LISPRO 100 [IU]/ML
INJECTION, SOLUTION INTRAVENOUS; SUBCUTANEOUS
Qty: 30 ML | Refills: 3 | Status: SHIPPED | OUTPATIENT
Start: 2025-06-06

## 2025-06-06 RX ORDER — INSULIN ASPART 100 [IU]/ML
INJECTION, SOLUTION INTRAVENOUS; SUBCUTANEOUS
Qty: 15 ML | Refills: 8
Start: 2025-06-06

## 2025-06-06 RX ORDER — BLOOD SUGAR DIAGNOSTIC
STRIP MISCELLANEOUS
Qty: 300 EACH | Refills: 3 | Status: SHIPPED | OUTPATIENT
Start: 2025-06-06

## 2025-06-06 RX ORDER — INSULIN GLARGINE 100 [IU]/ML
INJECTION, SOLUTION SUBCUTANEOUS
Qty: 15 ML | Refills: 5
Start: 2025-06-06

## 2025-06-06 NOTE — PROGRESS NOTES
Enrique Balbuena is a 69 y.o. male here for   Chief Complaint   Patient presents with    Diabetes       1. Have you been to the ER, urgent care clinic since your last visit?  Hospitalized since your last visit? -No    2. Have you seen or consulted any other health care providers outside of the [facility] since your last visit?  Include any pap smears or colon screening.-No      
A1c is    7.7 %     from     feb 2024   compared to  7.5 %    from    august 2023   compared to    8.2 %   from   jan 2023   compared to a1c  Is   7.3 %   from    July 2022   compared to   8.3 %  From  Feb 2022  Compared to  7.9 %   From august 2021   Compared  To  7.6 %  Feb 2021  Compared to  8.3 %   From august 2020  Compared to a1c is 7.3 %   From sept 2019  a1c is 7.9 %   From march 2019 ; a1c is 7.2 %     From sept 2018     comapred to  a1c  7.3 %  Today  May 2018 a1c is 9.6 %   From jan 2018  compared to A1c is  8.5 %          LOST     glycemic control    He has TOO MANY low  sugars , so I reduced insulin  He is skipping a shot at b-fast altogether as he is not eating b-fast    He had NO LOW SUGARS   Stay on  lantus to  10 units bid   Stay on  metformin     Suggested using RENAE   He saw the  insulin pump but he did not like it    Pt dislikes gadgets in general   As the sugars vary with food, it is an art he has to develop to adjust insulins to foods / sugars         March 2025     No improvement  in   glycemic control    He has TOO MANY low  sugars   He says,   he   had   several high sugars which did not show on download   I am asking him to split lantus to  10 units bid   Stay on  metformin   Suggested using RENAE   Suggested considering insulin pump or at least trying CGM  Pt dislikes gadgets , asking him to atleast learn about insulin pump  As the sugars vary with food, it is an art he has to develop to adjust insulins to foods / sugars       2. Hypoglycemia :  Educated on treating the hypoglycemia. Discussed insulin use and gave glucagon      3. HTN :  controlled,  changed from  prinivil 5  mg  to norvasc daily     4.  Dyslipidemia : continue lipitor     5. use of aspirin to prevent MI and TIA's discussed    6. Neuropathy  : not using neurontin       7. Due for eye check up      8. LOW albumin  -  he has no urine protein           Total time  42 min     >50 %  spent time on reviewing the info,

## 2025-06-06 NOTE — PATIENT INSTRUCTIONS
for one year and a visit is a must before refills are extended    Follow up appointments -  highly encourage you to make it when you are checking out. We can accommodate you into the schedule based on your clinical situation, but not for extending refills beyond a year. Labs are important to give refills and is important to get labs before the visit     Phone calls  -  Allow  24 hrs. for non-urgent calls to be returned  Prior authorization - It may take 2-4 weeks to process  Forms  -  FMLA, DMV etc., will take up to 2 weeks to process  Cancellations - please notify the office 2 days in advance   Samples  - will only be dispensed at visits       If not showing for the appointments and cancelling appointments within 24 hours are kept track of and three  of such situations in  two consecutive years will likely be considered for termination from the practice    -------------------------------------------------------------------------------------------------------------------

## 2025-06-20 ENCOUNTER — TELEPHONE (OUTPATIENT)
Age: 70
End: 2025-06-20

## 2025-06-20 NOTE — TELEPHONE ENCOUNTER
Informed pt of Dr. Bhardwaj's note. Pt wasn't at home, said he wasn't near pen and paper but accepted verbal instructions. Pt recited instructions back, verbalized understanding. Encouraged pt to call us if he needs reminder of instructions.

## 2025-06-20 NOTE — TELEPHONE ENCOUNTER
I reviewed  patients  dexcom     I gave him sample dexcom  and the reader     Tell him to keep dexcom  reader at safe place as I cannot give him that piece every time     He dislikes any  gadgets on his body        Reviewed   DEXCOM clarity downloaded report for 14 days and discussed with pt      - dexcom - a1c is 8.3  % for 14 days   - 14 day average glucose  209   - 23 % for high   34 %  very high   and  1 % low sugars and % in [employer]  Range  42%    - percent of utiization 86 %  - CV% 45 .1%       Check blood sugars immediately before each meal and at bedtime        Increase   Lantus insulin   to  14   units   in AM     and   decrease   to 14  units    at bed time     Increase   novolog  10  unit with b-fast  , decrease to 5    units with lunch  And   decrease  to 10  units with dinner  Based on carbs       rest all is same as before      Less than   70 -  No insulin

## 2025-07-08 ENCOUNTER — LAB (OUTPATIENT)
Age: 70
End: 2025-07-08

## 2025-07-08 DIAGNOSIS — Z79.4 LONG TERM (CURRENT) USE OF INSULIN (HCC): ICD-10-CM

## 2025-07-08 DIAGNOSIS — E10.65 TYPE 1 DIABETES MELLITUS WITH HYPERGLYCEMIA (HCC): Primary | ICD-10-CM

## 2025-07-08 DIAGNOSIS — E10.65 TYPE 1 DIABETES MELLITUS WITH HYPERGLYCEMIA (HCC): ICD-10-CM

## 2025-07-11 LAB
EST. AVERAGE GLUCOSE BLD GHB EST-MCNC: 212 MG/DL
HBA1C MFR BLD: 9 %HB

## 2025-07-14 ENCOUNTER — OFFICE VISIT (OUTPATIENT)
Age: 70
End: 2025-07-14
Payer: MEDICARE

## 2025-07-14 VITALS
WEIGHT: 231.8 LBS | HEART RATE: 73 BPM | BODY MASS INDEX: 31.4 KG/M2 | DIASTOLIC BLOOD PRESSURE: 82 MMHG | TEMPERATURE: 97.9 F | OXYGEN SATURATION: 98 % | SYSTOLIC BLOOD PRESSURE: 140 MMHG | HEIGHT: 72 IN

## 2025-07-14 DIAGNOSIS — Z79.4 LONG TERM (CURRENT) USE OF INSULIN (HCC): ICD-10-CM

## 2025-07-14 DIAGNOSIS — E10.65 TYPE 1 DIABETES MELLITUS WITH HYPERGLYCEMIA (HCC): ICD-10-CM

## 2025-07-14 DIAGNOSIS — E78.2 MIXED HYPERLIPIDEMIA: ICD-10-CM

## 2025-07-14 DIAGNOSIS — I10 ESSENTIAL (PRIMARY) HYPERTENSION: ICD-10-CM

## 2025-07-14 PROCEDURE — 1159F MED LIST DOCD IN RCRD: CPT | Performed by: INTERNAL MEDICINE

## 2025-07-14 PROCEDURE — 99214 OFFICE O/P EST MOD 30 MIN: CPT | Performed by: INTERNAL MEDICINE

## 2025-07-14 PROCEDURE — 1123F ACP DISCUSS/DSCN MKR DOCD: CPT | Performed by: INTERNAL MEDICINE

## 2025-07-14 PROCEDURE — 3077F SYST BP >= 140 MM HG: CPT | Performed by: INTERNAL MEDICINE

## 2025-07-14 PROCEDURE — 3079F DIAST BP 80-89 MM HG: CPT | Performed by: INTERNAL MEDICINE

## 2025-07-14 PROCEDURE — 3052F HG A1C>EQUAL 8.0%<EQUAL 9.0%: CPT | Performed by: INTERNAL MEDICINE

## 2025-07-14 PROCEDURE — 1160F RVW MEDS BY RX/DR IN RCRD: CPT | Performed by: INTERNAL MEDICINE

## 2025-07-14 PROCEDURE — 1125F AMNT PAIN NOTED PAIN PRSNT: CPT | Performed by: INTERNAL MEDICINE

## 2025-07-14 RX ORDER — AMLODIPINE BESYLATE 10 MG/1
TABLET ORAL
Qty: 90 TABLET | Refills: 3 | Status: SHIPPED | OUTPATIENT
Start: 2025-07-14

## 2025-07-14 RX ORDER — INSULIN ASPART 100 [IU]/ML
INJECTION, SOLUTION INTRAVENOUS; SUBCUTANEOUS
Qty: 15 ML | Refills: 8 | Status: SHIPPED | OUTPATIENT
Start: 2025-07-14

## 2025-07-14 RX ORDER — INSULIN GLARGINE 100 [IU]/ML
14 INJECTION, SOLUTION SUBCUTANEOUS 2 TIMES DAILY
Qty: 15 ML | Refills: 5 | Status: SHIPPED | OUTPATIENT
Start: 2025-07-14

## 2025-07-14 RX ORDER — GLUCAGON INJECTION, SOLUTION 1 MG/.2ML
INJECTION, SOLUTION SUBCUTANEOUS
Qty: 0.2 ML | Refills: 1 | Status: SHIPPED | OUTPATIENT
Start: 2025-07-14

## 2025-07-14 RX ORDER — INSULIN ASPART 100 [IU]/ML
INJECTION, SOLUTION INTRAVENOUS; SUBCUTANEOUS
Qty: 15 ML | Refills: 5 | Status: SHIPPED | OUTPATIENT
Start: 2025-07-14

## 2025-07-14 NOTE — PATIENT INSTRUCTIONS
SPECIFIC INSTRUCTIONS BELOW       90 day - ( metformin , lipitor and lisinopril, amlodipine  ) - [facility]      lipitor 40 mg at night      lisinopril to 5 mg a day       metformin  Plain 1000 mg one pill twice a day with meals ( he likes plain)              30 day for insulin, pen needles   and testing supplies   chad      Check blood sugars immediately before each meal and at bedtime            Lantus insulin  14  units   in AM     and   increase   14 units    at bed time     Take  novolog  10   unit with b-fast  , 5  units with lunch  And 10   units with dinner  Based on carbs        Take additional novolog  as folllows with meals:     150-200 mg 1 units     201-250 mg 2 units     251-300 mg 3 units     301-350 mg 4 units     351-400 mg 5 units     401-450 mg 6 units     451-500 mg 7 units     Less than   70 -  No insulin               -------------PAY ATTENTION TO THESE GENERAL INSTRUCTIONS -----------------      - The medications prescribed at this visit will not be available at pharmacy until 6 pm       - YOUR MED LIST IS NOT UP TO DATE AS SOME CHANGES ARE BEING MADE AFTER THE VISIT - FOLLOW SPECIFIC INSTRUCTIONS  ABOVE     -ANY tests other than blood work, which you opt to do  outside the  [facility] imaging facilities, you are responsible for prior authorizations if  required    - HEALTH MAINTENANCE IS NOT GOING TO BE UP TO DATE ON YOUR AVS- PLEASE IGNORE     Results     *Normal results will not be notified by a phone call starting January 1 2021   *If you have an upcoming visit, the results will be discussed at the visit   *Please sign up for MY CHART if you want access to your lab and test results  *Abnormal results which require immediate attention will be notified by phone call   *Abnormal results which do not require immediate assistance will be notified in 1-2 weeks       Refills    -    have your pharmacy send us a refill request . Refills are done max for one year and a visit is a must before 
no

## 2025-07-14 NOTE — PROGRESS NOTES
Enrique Balbuena is a 69 y.o. male here for   Chief Complaint   Patient presents with    Diabetes       1. Have you been to the ER, urgent care clinic since your last visit?  Hospitalized since your last visit? -no    2. Have you seen or consulted any other health care providers outside of the [facility] since your last visit?  Include any pap smears or colon screening.-no

## 2025-07-14 NOTE — PROGRESS NOTES
[facility] DIABETES AND ENDOCRINOLOGY              Ameena Bhardwaj MD FACE           HISTORY OF PRESENT ILLNESS  Enrique Balbuena is a 69   y.o. male.  HPI  Patient here for f/u after last visit of Type 1 diabetes mellitus   From  June 2025     He lost his mother on July 1st   Log is showing  sugars over 400 mg     Pt got the dexcom downloaded  as I gave him a sample         June 2025     His A1c shot up high  He started skipping b-fast - new thing   I lowered insulins last visit        March 2025    Upon review of log,  he has several low sugars   to 40s    He feels them       August 2024     Sugars are touching 250         Feb 2024     He comes in with meter   Log shows sugars  100 to  240 mg - he checks mostly fasting       Feb 2023     Hunting season, lost control   Eats on move he says   Lost  2  lbs  Went to eye visit     Old history :    Referred : by pcp  H/o diabetes for 3  years  Current A1C is over 9%   and symptoms/problems include polyuria, polydipsia and visual disturbances   Current diabetic medications include metformin, prandin  4 mg TID  , lantus 30 units hs .         Review of Systems   Constitutional: Negative.    Psychiatric/Behavioral: Negative for depression and memory loss. The patient does not have insomnia.      Phy exam : NAD    Lab Results   Component Value Date    LABA1C 9.0 (H) 07/08/2025    LABA1C 9.9 (H) 05/30/2025    LABA1C 8.0 (H) 02/28/2025     Lab Results   Component Value Date     02/28/2025    K 4.4 02/28/2025     02/28/2025    CO2 25 02/28/2025    BUN 18 02/28/2025    CREATININE 1.15 02/28/2025    GLUCOSE 195 (H) 02/28/2025    CALCIUM 9.7 02/28/2025    BILITOT 0.5 02/28/2025    ALKPHOS 84 02/28/2025    AST 19 02/28/2025    ALT 22 02/28/2025    LABGLOM 70 02/28/2025    GFRAA >60 02/18/2022    AGRATIO 1.0 (L) 01/27/2023    GLOB 4.0 02/28/2025    CHOL 157 02/28/2025    TRIG 66 02/28/2025    LDL 68.8 02/28/2025    HDL 75 02/28/2025        ASSESSMENT and